# Patient Record
Sex: FEMALE | Race: WHITE | NOT HISPANIC OR LATINO | Employment: PART TIME | URBAN - METROPOLITAN AREA
[De-identification: names, ages, dates, MRNs, and addresses within clinical notes are randomized per-mention and may not be internally consistent; named-entity substitution may affect disease eponyms.]

---

## 2021-06-22 ENCOUNTER — HOSPITAL ENCOUNTER (OUTPATIENT)
Dept: OTHER | Facility: HOSPITAL | Age: 41
Discharge: HOME | End: 2021-06-22

## 2022-08-25 ENCOUNTER — OFFICE VISIT (OUTPATIENT)
Dept: INTERNAL MEDICINE | Facility: CLINIC | Age: 42
End: 2022-08-25

## 2022-08-25 VITALS
TEMPERATURE: 98.1 F | SYSTOLIC BLOOD PRESSURE: 154 MMHG | HEART RATE: 81 BPM | BODY MASS INDEX: 30 KG/M2 | HEIGHT: 62 IN | DIASTOLIC BLOOD PRESSURE: 94 MMHG | WEIGHT: 163 LBS | OXYGEN SATURATION: 98 %

## 2022-08-25 DIAGNOSIS — Z11.59 NEED FOR HEPATITIS C SCREENING TEST: ICD-10-CM

## 2022-08-25 DIAGNOSIS — Z00.00 ENCOUNTER FOR MEDICAL EXAMINATION TO ESTABLISH CARE: Primary | ICD-10-CM

## 2022-08-25 DIAGNOSIS — I10 HYPERTENSION, UNSPECIFIED TYPE: Chronic | ICD-10-CM

## 2022-08-25 DIAGNOSIS — D64.9 ANEMIA, UNSPECIFIED TYPE: ICD-10-CM

## 2022-08-25 DIAGNOSIS — J30.9 ALLERGIC RHINITIS, UNSPECIFIED SEASONALITY, UNSPECIFIED TRIGGER: ICD-10-CM

## 2022-08-25 DIAGNOSIS — B35.4 RINGWORM OF BODY: ICD-10-CM

## 2022-08-25 DIAGNOSIS — Z13.220 SCREENING FOR LIPID DISORDERS: ICD-10-CM

## 2022-08-25 DIAGNOSIS — Z00.00 ANNUAL PHYSICAL EXAM: ICD-10-CM

## 2022-08-25 LAB
ALBUMIN SERPL-MCNC: 4.3 G/DL (ref 3.5–5.2)
ALBUMIN/GLOB SERPL: 1.5 G/DL
ALP SERPL-CCNC: 75 U/L (ref 39–117)
ALT SERPL W P-5'-P-CCNC: 10 U/L (ref 1–33)
ANION GAP SERPL CALCULATED.3IONS-SCNC: 12 MMOL/L (ref 5–15)
AST SERPL-CCNC: 19 U/L (ref 1–32)
BASOPHILS # BLD AUTO: 0.07 10*3/MM3 (ref 0–0.2)
BASOPHILS NFR BLD AUTO: 1.1 % (ref 0–1.5)
BILIRUB SERPL-MCNC: 0.2 MG/DL (ref 0–1.2)
BUN SERPL-MCNC: 9 MG/DL (ref 6–20)
BUN/CREAT SERPL: 10.1 (ref 7–25)
CALCIUM SPEC-SCNC: 9.8 MG/DL (ref 8.6–10.5)
CHLORIDE SERPL-SCNC: 99 MMOL/L (ref 98–107)
CHOLEST SERPL-MCNC: 207 MG/DL (ref 0–200)
CO2 SERPL-SCNC: 29 MMOL/L (ref 22–29)
CREAT SERPL-MCNC: 0.89 MG/DL (ref 0.57–1)
DEPRECATED RDW RBC AUTO: 39.6 FL (ref 37–54)
EGFRCR SERPLBLD CKD-EPI 2021: 83.1 ML/MIN/1.73
EOSINOPHIL # BLD AUTO: 0.14 10*3/MM3 (ref 0–0.4)
EOSINOPHIL NFR BLD AUTO: 2.2 % (ref 0.3–6.2)
ERYTHROCYTE [DISTWIDTH] IN BLOOD BY AUTOMATED COUNT: 14.4 % (ref 12.3–15.4)
GLOBULIN UR ELPH-MCNC: 2.8 GM/DL
GLUCOSE SERPL-MCNC: 79 MG/DL (ref 65–99)
HCT VFR BLD AUTO: 36.3 % (ref 34–46.6)
HCV AB SER DONR QL: NORMAL
HDLC SERPL-MCNC: 45 MG/DL (ref 40–60)
HGB BLD-MCNC: 11.6 G/DL (ref 12–15.9)
IMM GRANULOCYTES # BLD AUTO: 0.02 10*3/MM3 (ref 0–0.05)
IMM GRANULOCYTES NFR BLD AUTO: 0.3 % (ref 0–0.5)
LDLC SERPL CALC-MCNC: 132 MG/DL (ref 0–100)
LDLC/HDLC SERPL: 2.86 {RATIO}
LYMPHOCYTES # BLD AUTO: 1.73 10*3/MM3 (ref 0.7–3.1)
LYMPHOCYTES NFR BLD AUTO: 26.8 % (ref 19.6–45.3)
MCH RBC QN AUTO: 24.3 PG (ref 26.6–33)
MCHC RBC AUTO-ENTMCNC: 32 G/DL (ref 31.5–35.7)
MCV RBC AUTO: 76.1 FL (ref 79–97)
MONOCYTES # BLD AUTO: 0.43 10*3/MM3 (ref 0.1–0.9)
MONOCYTES NFR BLD AUTO: 6.7 % (ref 5–12)
NEUTROPHILS NFR BLD AUTO: 4.07 10*3/MM3 (ref 1.7–7)
NEUTROPHILS NFR BLD AUTO: 62.9 % (ref 42.7–76)
NRBC BLD AUTO-RTO: 0 /100 WBC (ref 0–0.2)
PLATELET # BLD AUTO: 298 10*3/MM3 (ref 140–450)
PMV BLD AUTO: 11.2 FL (ref 6–12)
POTASSIUM SERPL-SCNC: 3.1 MMOL/L (ref 3.5–5.2)
PROT SERPL-MCNC: 7.1 G/DL (ref 6–8.5)
RBC # BLD AUTO: 4.77 10*6/MM3 (ref 3.77–5.28)
SODIUM SERPL-SCNC: 140 MMOL/L (ref 136–145)
TRIGL SERPL-MCNC: 167 MG/DL (ref 0–150)
TSH SERPL DL<=0.05 MIU/L-ACNC: 1.07 UIU/ML (ref 0.27–4.2)
VLDLC SERPL-MCNC: 30 MG/DL (ref 5–40)
WBC NRBC COR # BLD: 6.46 10*3/MM3 (ref 3.4–10.8)

## 2022-08-25 PROCEDURE — 80061 LIPID PANEL: CPT | Performed by: NURSE PRACTITIONER

## 2022-08-25 PROCEDURE — 80050 GENERAL HEALTH PANEL: CPT | Performed by: NURSE PRACTITIONER

## 2022-08-25 PROCEDURE — 84466 ASSAY OF TRANSFERRIN: CPT | Performed by: NURSE PRACTITIONER

## 2022-08-25 PROCEDURE — 83540 ASSAY OF IRON: CPT | Performed by: NURSE PRACTITIONER

## 2022-08-25 PROCEDURE — 99214 OFFICE O/P EST MOD 30 MIN: CPT | Performed by: NURSE PRACTITIONER

## 2022-08-25 PROCEDURE — 99396 PREV VISIT EST AGE 40-64: CPT | Performed by: NURSE PRACTITIONER

## 2022-08-25 PROCEDURE — 86803 HEPATITIS C AB TEST: CPT | Performed by: NURSE PRACTITIONER

## 2022-08-25 RX ORDER — HYDROCHLOROTHIAZIDE 25 MG/1
1 TABLET ORAL DAILY
COMMUNITY
Start: 2022-05-19 | End: 2022-08-25

## 2022-08-25 RX ORDER — LISINOPRIL 5 MG/1
5 TABLET ORAL DAILY
Qty: 90 TABLET | Refills: 1 | Status: SHIPPED | OUTPATIENT
Start: 2022-08-25 | End: 2022-09-22

## 2022-08-25 RX ORDER — POTASSIUM CHLORIDE 750 MG/1
10 TABLET, FILM COATED, EXTENDED RELEASE ORAL DAILY
COMMUNITY

## 2022-08-25 RX ORDER — FLUTICASONE PROPIONATE 50 MCG
2 SPRAY, SUSPENSION (ML) NASAL DAILY
Qty: 16 G | Refills: 0 | Status: SHIPPED | OUTPATIENT
Start: 2022-08-25

## 2022-08-25 RX ORDER — FLUTICASONE PROPIONATE 50 MCG
SPRAY, SUSPENSION (ML) NASAL
COMMUNITY
Start: 2018-10-24 | End: 2022-08-25 | Stop reason: SDUPTHER

## 2022-08-25 NOTE — PROGRESS NOTES
"Chief Complaint  Establish Care and Hypertension (Needing HCTZ refilled)    Subjective          Lidia Davis presents to [facility]  Migraine  Headache pattern:  Headache sometimes there, sometimes not at all  Hypertension  This is a chronic problem. The problem is uncontrolled. Pertinent negatives include no anxiety, blurred vision, chest pain, headaches, malaise/fatigue, neck pain, orthopnea, palpitations, peripheral edema, PND, shortness of breath or sweats. Past treatments include diuretics. The current treatment provides mild improvement. Compliance problems include diet and exercise.  There is no history of angina, kidney disease, CAD/MI, CVA, heart failure, left ventricular hypertrophy, PVD or retinopathy.   Has not had issues for 2 years was on Preventative medication. (triptan)     Previous PCP: came from out of [facility]  Specialist(s): none  COVID vaccine: completed, including booster  Pneumonia vaccine: never  Shingles vaccine: never  Colon cancer screening: never, no FHx of colon cancer  Mammogram: April 2022, normal   Pap Smear: April 2022, normal       Usually 120's/90's   Has been out of K+ supplement for 1 week    Allergic Rhinitis:  Allegra and Flonase well controlled.         Current Outpatient Medications   Medication Instructions   • Clotrimazole 1 % ointment 1 application, Apply externally, 2 Times Daily   • fluticasone (FLONASE) 50 MCG/ACT nasal spray 2 sprays, Nasal, Daily   • lisinopril (PRINIVIL,ZESTRIL) 5 mg, Oral, Daily   • potassium chloride 10 MEQ CR tablet 10 mEq, Oral, Daily       The following portions of the patient's history were reviewed and updated as appropriate: allergies, current medications, past family history, past medical history, past social history, past surgical history, and problem list.    Objective   Vital Signs:   /94   Pulse 81   Temp 98.1 °F (36.7 °C) (Temporal)   Ht 157.5 cm (62\")   Wt 73.9 kg (163 lb)   SpO2 " 98%   BMI 29.81 kg/m²     Wt Readings from Last 3 Encounters:   08/25/22 73.9 kg (163 lb)     BP Readings from Last 3 Encounters:   08/25/22 154/94     Physical Exam   Appearance: No acute distress, well-nourished  Head: normocephalic, atraumatic  Eyes: extraocular movements intact, no scleral icterus, no conjunctival injection  Ears, Nose, and Throat: external ears normal, nares patent, moist mucous membranes  Cardiovascular: regular rate and rhythm. no murmurs, rubs, or gallops. no edema  Respiratory: breathing comfortably, symmetric chest rise, clear to auscultation bilaterally. No wheezes, rales, or rhonchi.  Neuro: alert and oriented to time, place, and person. Normal gait  Psych: normal mood and affect   Skin: annular lesion to right inner elbow   Result Review :   The following data was reviewed by: RD Moya on 08/25/2022:  Common labs    Common Labsle 8/25/22 8/25/22 8/25/22    1439 1439 1439   Glucose  79    BUN  9    Creatinine  0.89    Sodium  140    Potassium  3.1 (A)    Chloride  99    Calcium  9.8    Albumin  4.30    Total Bilirubin  0.2    Alkaline Phosphatase  75    AST (SGOT)  19    ALT (SGPT)  10    WBC 6.46     Hemoglobin 11.6 (A)     Hematocrit 36.3     Platelets 298     Total Cholesterol   207 (A)   Triglycerides   167 (A)   HDL Cholesterol   45   LDL Cholesterol    132 (A)   (A) Abnormal value                   No results found for: SARSANTIGEN, COVID19, RAPFLUA, RAPFLUB, FLUAAG, FLUABDAG, FLUABDAG, FLU, FLU, FLUBAG, RAPSCRN, STREPAAG, RSV, POCPREGUR, MONOSPOT, INR, LEADCAPBLD, POCLEAD, BILIRUBINUR    Procedures        Assessment and Plan    Diagnoses and all orders for this visit:    1. Encounter for medical examination to establish care (Primary)    2. Hypertension, unspecified type  Comments:  Stop HCTZ; start lisinopril, dash diet exercise; 4wk f/u  Orders:  -     CBC & Differential  -     TSH Rfx On Abnormal To Free T4  -     Comprehensive Metabolic Panel  -      lisinopril (PRINIVIL,ZESTRIL) 5 MG tablet; Take 1 tablet by mouth Daily.  Dispense: 90 tablet; Refill: 1    3. Screening for lipid disorders  -     Lipid Panel    4. Annual physical exam  -     CBC & Differential  -     TSH Rfx On Abnormal To Free T4  -     Comprehensive Metabolic Panel    5. Need for hepatitis C screening test  -     Hepatitis C Antibody    6. Allergic rhinitis, unspecified seasonality, unspecified trigger  -     fluticasone (FLONASE) 50 MCG/ACT nasal spray; 2 sprays into the nostril(s) as directed by provider Daily.  Dispense: 16 g; Refill: 0    7. Ringworm of body  -     Clotrimazole 1 % ointment; Apply 1 application topically 2 (Two) Times a Day.  Dispense: 56.7 g; Refill: 0    8. Anemia, unspecified type  -     Iron and TIBC; Future      Advised on diet, physical activity, sunscreen, helmet, texting and driving, etc      Medications Discontinued During This Encounter   Medication Reason   • hydroCHLOROthiazide (HYDRODIURIL) 25 MG tablet Historical Med - Therapy completed   • fluticasone (FLONASE) 50 MCG/ACT nasal spray Reorder          Follow Up   Return in about 4 weeks (around 9/22/2022) for Hypertension.  Patient was given instructions and counseling regarding her condition or for health maintenance advice. Please see specific information pulled into the AVS if appropriate.       RD Moya  08/26/22  09:39 EDT

## 2022-08-26 ENCOUNTER — TELEPHONE (OUTPATIENT)
Dept: INTERNAL MEDICINE | Facility: CLINIC | Age: 42
End: 2022-08-26

## 2022-08-26 DIAGNOSIS — D50.9 IRON DEFICIENCY ANEMIA, UNSPECIFIED IRON DEFICIENCY ANEMIA TYPE: Primary | ICD-10-CM

## 2022-08-26 LAB
IRON 24H UR-MRATE: 28 MCG/DL (ref 37–145)
IRON SATN MFR SERPL: 6 % (ref 20–50)
TIBC SERPL-MCNC: 490 MCG/DL (ref 298–536)
TRANSFERRIN SERPL-MCNC: 329 MG/DL (ref 200–360)

## 2022-08-26 NOTE — TELEPHONE ENCOUNTER
----- Message from DR Moya sent at 8/26/2022 10:32 AM EDT -----  Anemia noted on labs. I reflexed an iron profile which is low and likely cause of anemia. I would like patient to start daily iron supplement and we will recheck in 3 months.

## 2022-09-22 ENCOUNTER — OFFICE VISIT (OUTPATIENT)
Dept: INTERNAL MEDICINE | Facility: CLINIC | Age: 42
End: 2022-09-22

## 2022-09-22 VITALS
BODY MASS INDEX: 31.83 KG/M2 | HEIGHT: 62 IN | HEART RATE: 77 BPM | WEIGHT: 173 LBS | DIASTOLIC BLOOD PRESSURE: 91 MMHG | SYSTOLIC BLOOD PRESSURE: 146 MMHG | TEMPERATURE: 98.9 F | OXYGEN SATURATION: 98 %

## 2022-09-22 DIAGNOSIS — I10 HYPERTENSION, UNSPECIFIED TYPE: Primary | ICD-10-CM

## 2022-09-22 DIAGNOSIS — D50.9 IRON DEFICIENCY ANEMIA, UNSPECIFIED IRON DEFICIENCY ANEMIA TYPE: ICD-10-CM

## 2022-09-22 DIAGNOSIS — I10 HYPERTENSION, UNSPECIFIED TYPE: ICD-10-CM

## 2022-09-22 DIAGNOSIS — Z23 INFLUENZA VACCINE NEEDED: ICD-10-CM

## 2022-09-22 PROCEDURE — 99214 OFFICE O/P EST MOD 30 MIN: CPT | Performed by: NURSE PRACTITIONER

## 2022-09-22 PROCEDURE — 90686 IIV4 VACC NO PRSV 0.5 ML IM: CPT | Performed by: NURSE PRACTITIONER

## 2022-09-22 PROCEDURE — 90471 IMMUNIZATION ADMIN: CPT | Performed by: NURSE PRACTITIONER

## 2022-09-22 RX ORDER — AMLODIPINE BESYLATE 2.5 MG/1
2.5 TABLET ORAL DAILY
Qty: 90 TABLET | Refills: 0 | Status: SHIPPED | OUTPATIENT
Start: 2022-09-22 | End: 2022-10-26

## 2022-09-22 RX ORDER — AMLODIPINE BESYLATE 2.5 MG/1
2.5 TABLET ORAL DAILY
Qty: 30 TABLET | Refills: 1 | Status: SHIPPED | OUTPATIENT
Start: 2022-09-22 | End: 2022-09-22

## 2022-09-22 NOTE — ASSESSMENT & PLAN NOTE
Hypertension is improving with treatment.  Dietary sodium restriction.  Weight loss.  Regular aerobic exercise.  Blood pressure will be reassessed in 4 weeks.

## 2022-09-22 NOTE — TELEPHONE ENCOUNTER
If I send 90 days and we have to increase her medication at the next visit she will be stuck with many tablets that she may not need. Please call and double check this is ok with her.

## 2022-09-22 NOTE — PROGRESS NOTES
"Chief Complaint  Hypertension (4 week follow-up)    Subjective          Lidia Davis presents to [facility]  Anemia  Presents for initial visit. The condition has lasted for 4 weeks. Symptoms include malaise/fatigue. There has been no abdominal pain, anorexia, bruising/bleeding easily, confusion, fever, leg swelling, light-headedness, pallor, palpitations, paresthesias, pica or weight loss. (None )   Treatments tried: sent in ferrous sulfate today        Hypertension  This is a chronic problem. The problem is uncontrolled. Pertinent negatives include no anxiety, blurred vision, chest pain, headaches, malaise/fatigue, neck pain, orthopnea, palpitations, peripheral edema, PND, shortness of breath or sweats. Past treatments include diuretics. The current treatment provides mild improvement. Compliance problems include diet and exercise.  There is no history of angina, kidney disease, CAD/MI, CVA, heart failure, left ventricular hypertrophy, PVD or retinopathy.  Current Outpatient Medications   Medication Instructions   • amLODIPine (NORVASC) 2.5 mg, Oral, Daily   • Clotrimazole 1 % ointment 1 application, Apply externally, 2 Times Daily   • fluticasone (FLONASE) 50 MCG/ACT nasal spray 2 sprays, Nasal, Daily   • Iron (Ferrous Sulfate) 325 mg, Oral, Daily   • potassium chloride 10 MEQ CR tablet 10 mEq, Oral, Daily       The following portions of the patient's history were reviewed and updated as appropriate: allergies, current medications, past family history, past medical history, past social history, past surgical history, and problem list.    Objective   Vital Signs:   /91 (BP Location: Left arm, Patient Position: Sitting, Cuff Size: Adult)   Pulse 77   Temp 98.9 °F (37.2 °C) (Temporal)   Ht 157.5 cm (62\")   Wt 78.5 kg (173 lb)   SpO2 98%   BMI 31.64 kg/m²     Wt Readings from Last 3 Encounters:   09/22/22 78.5 kg (173 lb)   08/25/22 73.9 kg (163 lb)     BP " Readings from Last 3 Encounters:   09/22/22 146/91   08/25/22 154/94     Physical Exam   Appearance: No acute distress, well-nourished  Head: normocephalic, atraumatic  Eyes: extraocular movements intact, no scleral icterus, no conjunctival injection  Ears, Nose, and Throat: external ears normal, nares patent, moist mucous membranes  Cardiovascular: regular rate and rhythm. no murmurs, rubs, or gallops. no edema  Respiratory: breathing comfortably, symmetric chest rise, clear to auscultation bilaterally. No wheezes, rales, or rhonchi.  Neuro: alert and oriented to time, place, and person. Normal gait  Psych: normal mood and affect     Result Review :   The following data was reviewed by: RD Moya on 09/22/2022:  Common labs    Common Labs 8/25/22 8/25/22 8/25/22    1439 1439 1439   Glucose  79    BUN  9    Creatinine  0.89    Sodium  140    Potassium  3.1 (A)    Chloride  99    Calcium  9.8    Albumin  4.30    Total Bilirubin  0.2    Alkaline Phosphatase  75    AST (SGOT)  19    ALT (SGPT)  10    WBC 6.46     Hemoglobin 11.6 (A)     Hematocrit 36.3     Platelets 298     Total Cholesterol   207 (A)   Triglycerides   167 (A)   HDL Cholesterol   45   LDL Cholesterol    132 (A)   (A) Abnormal value                   No results found for: SARSANTIGEN, COVID19, RAPFLUA, RAPFLUB, FLUAAG, FLUABDAG, FLUABDAG, FLU, FLU, FLUBAG, RAPSCRN, STREPAAG, RSV, POCPREGUR, MONOSPOT, INR, LEADCAPBLD, POCLEAD, BILIRUBINUR    Procedures        Assessment and Plan    Diagnoses and all orders for this visit:    1. Hypertension, unspecified type (Primary)  Comments:  lisinopril was making her nauseated without good control, will switch to amllodipine.   Assessment & Plan:  Hypertension is improving with treatment.  Dietary sodium restriction.  Weight loss.  Regular aerobic exercise.  Blood pressure will be reassessed in 4 weeks.    Orders:  -     amLODIPine (NORVASC) 2.5 MG tablet; Take 1 tablet by mouth Daily.  Dispense: 30  tablet; Refill: 1    2. Influenza vaccine needed  -     FluLaval/Fluzone >6 mos (5084-2480)    3. Iron deficiency anemia, unspecified iron deficiency anemia type  -     Iron, Ferrous Sulfate, 325 (65 Fe) MG tablet; Take 325 mg by mouth Daily.  Dispense: 90 tablet; Refill: 1    Counseling was given to patient for the following topics: diagnostic results, instructions for management, risk factor reductions, risks and benefits of treatment options and importance of treatment compliance .       Medications Discontinued During This Encounter   Medication Reason   • lisinopril (PRINIVIL,ZESTRIL) 5 MG tablet Historical Med - Therapy completed   • Iron, Ferrous Sulfate, 325 (65 Fe) MG tablet Reorder          Follow Up   Return in about 4 weeks (around 10/20/2022) for Hypertension.  Patient was given instructions and counseling regarding her condition or for health maintenance advice. Please see specific information pulled into the AVS if appropriate.       Veronica Acuna, RD  09/22/22  08:26 EDT

## 2022-10-26 ENCOUNTER — OFFICE VISIT (OUTPATIENT)
Dept: INTERNAL MEDICINE | Facility: CLINIC | Age: 42
End: 2022-10-26

## 2022-10-26 VITALS
TEMPERATURE: 97.7 F | BODY MASS INDEX: 32.17 KG/M2 | WEIGHT: 174.8 LBS | HEART RATE: 81 BPM | SYSTOLIC BLOOD PRESSURE: 150 MMHG | HEIGHT: 62 IN | OXYGEN SATURATION: 98 % | DIASTOLIC BLOOD PRESSURE: 89 MMHG

## 2022-10-26 DIAGNOSIS — I10 HYPERTENSION, UNSPECIFIED TYPE: Primary | ICD-10-CM

## 2022-10-26 DIAGNOSIS — L98.9 SKIN LESION: ICD-10-CM

## 2022-10-26 PROCEDURE — 99213 OFFICE O/P EST LOW 20 MIN: CPT | Performed by: NURSE PRACTITIONER

## 2022-10-26 RX ORDER — AMLODIPINE BESYLATE 5 MG/1
5 TABLET ORAL DAILY
Qty: 30 TABLET | Refills: 1 | Status: SHIPPED | OUTPATIENT
Start: 2022-10-26 | End: 2022-11-30 | Stop reason: SINTOL

## 2022-10-26 NOTE — PROGRESS NOTES
"Chief Complaint  Hypertension (Follow up ) and spots on face  (6 weeks just on the right side close to her eye temple area)    Subjective          Lidia Davis presents to [facility]  History of Present Illness    Hypertension  This is a chronic problem. The problem is uncontrolled. Pertinent negatives include no anxiety, blurred vision, chest pain, headaches, malaise/fatigue, neck pain, orthopnea, palpitations, peripheral edema, PND, shortness of breath or sweats. Past treatments include diuretics. The current treatment provides mild improvement. Compliance problems include diet and exercise.  There is no history of angina, kidney disease, CAD/MI, CVA, heart failure, left ventricular hypertrophy, PVD or retinopathy.  Current Outpatient Medications   Medication Instructions   • amLODIPine (NORVASC) 5 mg, Oral, Daily   • Clotrimazole 1 % ointment 1 application, Apply externally, 2 Times Daily   • fluticasone (FLONASE) 50 MCG/ACT nasal spray 2 sprays, Nasal, Daily   • Iron (Ferrous Sulfate) 325 mg, Oral, Daily   • mupirocin (BACTROBAN) 2 % ointment 1 application, Topical, 3 Times Daily   • potassium chloride 10 MEQ CR tablet 10 mEq, Oral, Daily       The following portions of the patient's history were reviewed and updated as appropriate: allergies, current medications, past family history, past medical history, past social history, past surgical history, and problem list.    Objective   Vital Signs:   /89 (BP Location: Left arm)   Pulse 81   Temp 97.7 °F (36.5 °C) (Temporal)   Ht 157.5 cm (62\")   Wt 79.3 kg (174 lb 12.8 oz)   SpO2 98%   BMI 31.97 kg/m²     Wt Readings from Last 3 Encounters:   10/26/22 79.3 kg (174 lb 12.8 oz)   09/22/22 78.5 kg (173 lb)   08/25/22 73.9 kg (163 lb)     BP Readings from Last 3 Encounters:   10/26/22 150/89   09/22/22 146/91   08/25/22 154/94     Physical Exam   Appearance: No acute distress, well-nourished  Head: " normocephalic, atraumatic  Eyes: extraocular movements intact, no scleral icterus, no conjunctival injection  Ears, Nose, and Throat: external ears normal, nares patent, moist mucous membranes  Cardiovascular: regular rate and rhythm. no murmurs, rubs, or gallops. no edema  Respiratory: breathing comfortably, symmetric chest rise, clear to auscultation bilaterally. No wheezes, rales, or rhonchi.  Neuro: alert and oriented to time, place, and person. Normal gait  Psych: normal mood and affect     Result Review :   The following data was reviewed by: RD Moya on 10/26/2022:  Common labs    Common Labs 8/25/22 8/25/22 8/25/22    1439 1439 1439   Glucose  79    BUN  9    Creatinine  0.89    Sodium  140    Potassium  3.1 (A)    Chloride  99    Calcium  9.8    Albumin  4.30    Total Bilirubin  0.2    Alkaline Phosphatase  75    AST (SGOT)  19    ALT (SGPT)  10    WBC 6.46     Hemoglobin 11.6 (A)     Hematocrit 36.3     Platelets 298     Total Cholesterol   207 (A)   Triglycerides   167 (A)   HDL Cholesterol   45   LDL Cholesterol    132 (A)   (A) Abnormal value                   No results found for: SARSANTIGEN, COVID19, RAPFLUA, RAPFLUB, FLUAAG, FLUABDAG, FLUABDAG, FLU, FLU, FLUBAG, RAPSCRN, STREPAAG, RSV, POCPREGUR, MONOSPOT, INR, LEADCAPBLD, POCLEAD, BILIRUBINUR    Procedures        Assessment and Plan    Diagnoses and all orders for this visit:    1. Hypertension, unspecified type (Primary)  -     amLODIPine (NORVASC) 5 MG tablet; Take 1 tablet by mouth Daily.  Dispense: 30 tablet; Refill: 1    2. Skin lesion  -     mupirocin (BACTROBAN) 2 % ointment; Apply 1 application topically to the appropriate area as directed 3 (Three) Times a Day.  Dispense: 30 g; Refill: 0          Medications Discontinued During This Encounter   Medication Reason   • amLODIPine (NORVASC) 2.5 MG tablet           Follow Up   Return in about 4 weeks (around 11/23/2022) for Hypertension.  Patient was given instructions and  counseling regarding her condition or for health maintenance advice. Please see specific information pulled into the AVS if appropriate.       Veronica Acuna, RD  10/26/22  08:18 EDT

## 2022-11-30 ENCOUNTER — OFFICE VISIT (OUTPATIENT)
Dept: INTERNAL MEDICINE | Facility: CLINIC | Age: 42
End: 2022-11-30

## 2022-11-30 VITALS
WEIGHT: 175 LBS | HEIGHT: 62 IN | SYSTOLIC BLOOD PRESSURE: 150 MMHG | HEART RATE: 96 BPM | DIASTOLIC BLOOD PRESSURE: 90 MMHG | BODY MASS INDEX: 32.2 KG/M2 | OXYGEN SATURATION: 98 % | TEMPERATURE: 98.2 F

## 2022-11-30 DIAGNOSIS — M25.473 ANKLE SWELLING, UNSPECIFIED LATERALITY: ICD-10-CM

## 2022-11-30 DIAGNOSIS — I10 HYPERTENSION, UNSPECIFIED TYPE: Primary | Chronic | ICD-10-CM

## 2022-11-30 PROCEDURE — 99213 OFFICE O/P EST LOW 20 MIN: CPT | Performed by: NURSE PRACTITIONER

## 2022-11-30 RX ORDER — LISINOPRIL 10 MG/1
10 TABLET ORAL DAILY
Qty: 30 TABLET | Refills: 1 | Status: SHIPPED | OUTPATIENT
Start: 2022-11-30 | End: 2022-12-14 | Stop reason: SDUPTHER

## 2022-11-30 NOTE — PROGRESS NOTES
"Chief Complaint  Hypertension (Pt here to discuss BP issues/)    Subjective          Lidia Davis presents to [facility]  History of Present Illness    Hypertension  This is a chronic problem. The problem is uncontrolled. Pertinent negatives include no anxiety, blurred vision, chest pain, headaches, malaise/fatigue, neck pain, orthopnea, palpitations, peripheral edema, PND, shortness of breath or sweats. Past treatments include diuretics. The current treatment provides mild improvement. Compliance problems include diet and exercise.  There is no history of angina, kidney disease, CAD/MI, CVA, heart failure, left ventricular hypertrophy, PVD or retinopathy.      Current Outpatient Medications   Medication Instructions   • Clotrimazole 1 % ointment 1 application, Apply externally, 2 Times Daily   • fluticasone (FLONASE) 50 MCG/ACT nasal spray 2 sprays, Nasal, Daily   • Iron (Ferrous Sulfate) 325 mg, Oral, Daily   • lisinopril (PRINIVIL,ZESTRIL) 10 mg, Oral, Daily   • mupirocin (BACTROBAN) 2 % ointment 1 application, Topical, 3 Times Daily   • potassium chloride 10 MEQ CR tablet 10 mEq, Oral, Daily       The following portions of the patient's history were reviewed and updated as appropriate: allergies, current medications, past family history, past medical history, past social history, past surgical history, and problem list.    Objective   Vital Signs:   /90 (BP Location: Left arm, Patient Position: Sitting, Cuff Size: Adult)   Pulse 96   Temp 98.2 °F (36.8 °C) (Temporal)   Ht 157.5 cm (62\")   Wt 79.4 kg (175 lb)   SpO2 98%   BMI 32.01 kg/m²     Wt Readings from Last 3 Encounters:   11/30/22 79.4 kg (175 lb)   10/26/22 79.3 kg (174 lb 12.8 oz)   09/22/22 78.5 kg (173 lb)     BP Readings from Last 3 Encounters:   11/30/22 150/90   10/26/22 150/89   09/22/22 146/91     Physical Exam   Appearance: No acute distress, well-nourished  Head: normocephalic, " atraumatic  Eyes: extraocular movements intact, no scleral icterus, no conjunctival injection  Ears, Nose, and Throat: external ears normal, nares patent, moist mucous membranes  Cardiovascular: regular rate and rhythm. no murmurs, rubs, or gallops. no edema  Respiratory: breathing comfortably, symmetric chest rise, clear to auscultation bilaterally. No wheezes, rales, or rhonchi.  Neuro: alert and oriented to time, place, and person. Normal gait  Psych: normal mood and affect     Result Review :   The following data was reviewed by: RD Moya on 11/30/2022:  Common labs    Common Labs 8/25/22 8/25/22 8/25/22    1439 1439 1439   Glucose  79    BUN  9    Creatinine  0.89    Sodium  140    Potassium  3.1 (A)    Chloride  99    Calcium  9.8    Albumin  4.30    Total Bilirubin  0.2    Alkaline Phosphatase  75    AST (SGOT)  19    ALT (SGPT)  10    WBC 6.46     Hemoglobin 11.6 (A)     Hematocrit 36.3     Platelets 298     Total Cholesterol   207 (A)   Triglycerides   167 (A)   HDL Cholesterol   45   LDL Cholesterol    132 (A)   (A) Abnormal value                   No results found for: SARSANTIGEN, COVID19, RAPFLUA, RAPFLUB, FLUAAG, FLUABDAG, FLUABDAG, FLU, FLU, FLUBAG, RAPSCRN, STREPAAG, RSV, POCPREGUR, MONOSPOT, INR, LEADCAPBLD, POCLEAD, BILIRUBINUR    Procedures        Assessment and Plan    Diagnoses and all orders for this visit:    1. Hypertension, unspecified type (Primary)  Comments:  changing from amlodipine to lisinopril; DASH diet and exercise   Orders:  -     lisinopril (PRINIVIL,ZESTRIL) 10 MG tablet; Take 1 tablet by mouth Daily.  Dispense: 30 tablet; Refill: 1    2. Ankle swelling, unspecified laterality  Comments:  likely r/t amlodipine           Medications Discontinued During This Encounter   Medication Reason   • amLODIPine (NORVASC) 5 MG tablet Side effects          Follow Up   Return in about 2 weeks (around 12/14/2022) for Hypertension.  Patient was given instructions and counseling  regarding her condition or for health maintenance advice. Please see specific information pulled into the AVS if appropriate.       Veronica Acuna, RD  11/30/22  10:21 EST

## 2022-12-05 ENCOUNTER — E-VISIT (OUTPATIENT)
Dept: ADMINISTRATIVE | Facility: OTHER | Age: 42
End: 2022-12-05

## 2022-12-05 NOTE — E-VISIT ESCALATED
Chief Complaint: Eye pain, eye bump, or irritation   Patient was shown the following escalation message:   Some conditions need a visit with a healthcare provider   Severe eyelid swelling may suggest a complicated condition. You should speak with a provider to get care.   ----------   Patient Interview Transcript:   Which of these symptoms are bothering you? Select all that apply.    Redness in the whites of the eye(s)    Eye drainage (such as excess tears, mucus, or pus)    Eyelid swelling    Eyelid redness    Crusting of the eyelids or eyelashes   Not selected:    Eye dryness    Bump or pimple on or inside the eyelid    None of the above   Do you have any of these eye symptoms? Select all that apply.    Itchiness   Not selected:    Burning, ai, or gritty feeling    Pain inside the eyeball    Eyelid pain    Flaking or dry skin on the eyelid    None of the above   How long have you had your eye symptoms? Select one.    Less than 1 day   Not selected:    1 to 3 days    4 to 7 days    More than 7 days   Which eye is affected? Select one.    My left eye   Not selected:    My right eye    Both eyes    It started in one eye, but now both eyes are affected   Does it feel like there's something in your eye that's making it hard to open your eye or keep it open? Select one.    No   Not selected:    Yes   Is the skin around your eye red, swollen, or warm to the touch? Pay special attention to the area above your eyelid, the upper cheek, forehead, and the top part of the nose (between the eyes). Select one.    Yes   Not selected:    No   You mentioned that your eyelid is swollen. How would you describe it? Select one.    Swollen, and it's hard to open my eye   Not selected:    A little puffy, but I can easily open my eye    My eye is completely swollen shut   ----------   Medical history   Medical history data does not currently exist for this patient.

## 2022-12-14 ENCOUNTER — OFFICE VISIT (OUTPATIENT)
Dept: INTERNAL MEDICINE | Facility: CLINIC | Age: 42
End: 2022-12-14

## 2022-12-14 VITALS
WEIGHT: 175 LBS | HEART RATE: 107 BPM | TEMPERATURE: 98.7 F | HEIGHT: 62 IN | OXYGEN SATURATION: 98 % | SYSTOLIC BLOOD PRESSURE: 120 MMHG | BODY MASS INDEX: 32.2 KG/M2 | DIASTOLIC BLOOD PRESSURE: 69 MMHG

## 2022-12-14 DIAGNOSIS — I10 HYPERTENSION, UNSPECIFIED TYPE: Primary | Chronic | ICD-10-CM

## 2022-12-14 PROCEDURE — 99213 OFFICE O/P EST LOW 20 MIN: CPT | Performed by: NURSE PRACTITIONER

## 2022-12-14 RX ORDER — LISINOPRIL 10 MG/1
10 TABLET ORAL DAILY
Qty: 90 TABLET | Refills: 1 | Status: SHIPPED | OUTPATIENT
Start: 2022-12-14 | End: 2023-01-31

## 2022-12-14 NOTE — PROGRESS NOTES
"Chief Complaint  Hypertension (2 week follow-up)    Subjective          Lidia Davis presents to [facility]  History of Present Illness    Hypertension  This is a chronic problem. The problem is uncontrolled. Pertinent negatives include no anxiety, blurred vision, chest pain, headaches, malaise/fatigue, neck pain, orthopnea, palpitations, peripheral edema, PND, shortness of breath or sweats. Past treatments include diuretics. The current treatment provides mild improvement. Compliance problems include diet and exercise.  There is no history of angina, kidney disease, CAD/MI, CVA, heart failure, left ventricular hypertrophy, PVD or retinopathy.      Current Outpatient Medications   Medication Instructions   • Clotrimazole 1 % ointment 1 application, Apply externally, 2 Times Daily   • fluticasone (FLONASE) 50 MCG/ACT nasal spray 2 sprays, Nasal, Daily   • Iron (Ferrous Sulfate) 325 mg, Oral, Daily   • lisinopril (PRINIVIL,ZESTRIL) 10 mg, Oral, Daily   • mupirocin (BACTROBAN) 2 % ointment 1 application, Topical, 3 Times Daily   • potassium chloride 10 MEQ CR tablet 10 mEq, Oral, Daily       The following portions of the patient's history were reviewed and updated as appropriate: allergies, current medications, past family history, past medical history, past social history, past surgical history, and problem list.    Objective   Vital Signs:   /69 (BP Location: Left arm, Patient Position: Sitting, Cuff Size: Adult)   Pulse 107   Temp 98.7 °F (37.1 °C) (Temporal)   Ht 157.5 cm (62\")   Wt 79.4 kg (175 lb)   SpO2 98%   BMI 32.01 kg/m²     Wt Readings from Last 3 Encounters:   12/14/22 79.4 kg (175 lb)   12/05/22 79.4 kg (175 lb)   11/30/22 79.4 kg (175 lb)     BP Readings from Last 3 Encounters:   12/14/22 120/69   12/05/22 148/98   11/30/22 150/90     Physical Exam   Appearance: No acute distress, well-nourished  Head: normocephalic, atraumatic  Eyes: extraocular " movements intact, no scleral icterus, no conjunctival injection  Ears, Nose, and Throat: external ears normal, nares patent, moist mucous membranes  Cardiovascular: regular rate and rhythm. no murmurs, rubs, or gallops. no edema  Respiratory: breathing comfortably, symmetric chest rise, clear to auscultation bilaterally. No wheezes, rales, or rhonchi.  Neuro: alert and oriented to time, place, and person. Normal gait  Psych: normal mood and affect     Result Review :   The following data was reviewed by: RD Moya on 12/14/2022:  Common labs    Common Labs 8/25/22 8/25/22 8/25/22    1439 1439 1439   Glucose  79    BUN  9    Creatinine  0.89    Sodium  140    Potassium  3.1 (A)    Chloride  99    Calcium  9.8    Albumin  4.30    Total Bilirubin  0.2    Alkaline Phosphatase  75    AST (SGOT)  19    ALT (SGPT)  10    WBC 6.46     Hemoglobin 11.6 (A)     Hematocrit 36.3     Platelets 298     Total Cholesterol   207 (A)   Triglycerides   167 (A)   HDL Cholesterol   45   LDL Cholesterol    132 (A)   (A) Abnormal value                   Lab Results   Component Value Date    SARSANTIGEN Not Detected 12/05/2022    RAPSCRN Negative 12/05/2022       Procedures        Assessment and Plan    Diagnoses and all orders for this visit:    1. Hypertension, unspecified type (Primary)  Comments:  continue current dose of lisinopril; DASH diet and exercise  Orders:  -     lisinopril (PRINIVIL,ZESTRIL) 10 MG tablet; Take 1 tablet by mouth Daily.  Dispense: 90 tablet; Refill: 1          Medications Discontinued During This Encounter   Medication Reason   • lisinopril (PRINIVIL,ZESTRIL) 10 MG tablet Reorder          Follow Up   Return in about 3 months (around 3/14/2023) for Hypertension.  Patient was given instructions and counseling regarding her condition or for health maintenance advice. Please see specific information pulled into the AVS if appropriate.       RD Moya  12/14/22  08:39 EST

## 2022-12-22 DIAGNOSIS — I10 HYPERTENSION, UNSPECIFIED TYPE: ICD-10-CM

## 2022-12-22 RX ORDER — AMLODIPINE BESYLATE 5 MG/1
5 TABLET ORAL DAILY
Qty: 30 TABLET | Refills: 1 | OUTPATIENT
Start: 2022-12-22

## 2023-01-31 DIAGNOSIS — I10 HYPERTENSION, UNSPECIFIED TYPE: Chronic | ICD-10-CM

## 2023-01-31 RX ORDER — LISINOPRIL 10 MG/1
10 TABLET ORAL DAILY
Qty: 30 TABLET | Refills: 0 | Status: SHIPPED | OUTPATIENT
Start: 2023-01-31 | End: 2023-02-28

## 2023-02-01 ENCOUNTER — OFFICE VISIT (OUTPATIENT)
Dept: INTERNAL MEDICINE | Facility: CLINIC | Age: 43
End: 2023-02-01
Payer: COMMERCIAL

## 2023-02-01 VITALS
BODY MASS INDEX: 33.01 KG/M2 | SYSTOLIC BLOOD PRESSURE: 128 MMHG | WEIGHT: 179.4 LBS | OXYGEN SATURATION: 99 % | DIASTOLIC BLOOD PRESSURE: 88 MMHG | TEMPERATURE: 98.1 F | HEART RATE: 80 BPM | HEIGHT: 62 IN

## 2023-02-01 DIAGNOSIS — B96.89 BACTERIAL CONJUNCTIVITIS OF BOTH EYES: ICD-10-CM

## 2023-02-01 DIAGNOSIS — R05.1 ACUTE COUGH: ICD-10-CM

## 2023-02-01 DIAGNOSIS — H10.9 BACTERIAL CONJUNCTIVITIS OF BOTH EYES: ICD-10-CM

## 2023-02-01 DIAGNOSIS — K62.5 BRBPR (BRIGHT RED BLOOD PER RECTUM): Primary | ICD-10-CM

## 2023-02-01 PROCEDURE — 99213 OFFICE O/P EST LOW 20 MIN: CPT | Performed by: NURSE PRACTITIONER

## 2023-02-01 RX ORDER — METHYLPREDNISOLONE 4 MG/1
TABLET ORAL
Qty: 21 EACH | Refills: 0 | Status: SHIPPED | OUTPATIENT
Start: 2023-02-01 | End: 2023-03-16

## 2023-02-01 RX ORDER — ERYTHROMYCIN 5 MG/G
OINTMENT OPHTHALMIC NIGHTLY
Qty: 3.5 G | Refills: 0 | Status: SHIPPED | OUTPATIENT
Start: 2023-02-01 | End: 2023-02-08

## 2023-02-01 NOTE — PROGRESS NOTES
Chief Complaint  Cough (6+ weeks /Post nasal drip patient thinks ), Rash (Keeps coming and going around- 6 weeks as well /Patient had pink eye the second week of December and she thinks since then they never cleared up all the way ), and Black or Bloody Stool (Patient state she thinks she had blood in her stool- /Just over a week ago she was getting off her period she was unsure of due too that but the  week before her period she seen it )    Subjective          Lidia Davis presents to [facility]  Cough  This is a new problem. The cough is non-productive. Associated symptoms include postnasal drip. Pertinent negatives include no chest pain, chills, ear congestion, ear pain, fever, headaches, heartburn, hemoptysis, myalgias, nasal congestion, rash, rhinorrhea, sore throat, shortness of breath, sweats, weight loss or wheezing.   Rectal Bleeding  This is a new problem. Associated symptoms include coughing. Pertinent negatives include no abdominal pain, anorexia, arthralgias, change in bowel habit, chest pain, chills, congestion, fatigue, fever, headaches, joint swelling, myalgias, nausea, neck pain, numbness, rash, sore throat, swollen glands, urinary symptoms, vertigo, visual change, vomiting or weakness. Exacerbated by: Patient reports a burning sensation with bloody stool both on toilet paper and in toilet.  Has never had colonoscopy. Treatments tried: Denies family history of colon cancer.  States that bowel movements are nonpainful.   was tested for COVID and was negative.       Pink eye 1st or second week of December and since then she had persistent cough   Feels like it is PND Worse at night   Was RX'd moxifloxacin by Urgent care and finished the drops.   Continues to wake up with red eye and drainage and has rash around eyes.     Has been taking allegra and flonase and it hasnt helped.   Got all new make up after the pink eye . Switched her moisterizer.  "        Current Outpatient Medications   Medication Instructions   • Clotrimazole 1 % ointment 1 application, Apply externally, 2 Times Daily   • erythromycin (ROMYCIN) 5 MG/GM ophthalmic ointment Left Eye, Nightly   • fluticasone (FLONASE) 50 MCG/ACT nasal spray 2 sprays, Nasal, Daily   • Iron (Ferrous Sulfate) 325 mg, Oral, Daily   • lisinopril (PRINIVIL,ZESTRIL) 10 mg, Oral, Daily   • methylPREDNISolone (Medrol) 4 MG dose pack Take as directed on package instructions.   • mupirocin (BACTROBAN) 2 % ointment 1 application, Topical, 3 Times Daily   • potassium chloride 10 MEQ CR tablet 10 mEq, Oral, Daily       The following portions of the patient's history were reviewed and updated as appropriate: allergies, current medications, past family history, past medical history, past social history, past surgical history, and problem list.    Objective   Vital Signs:   /88 (BP Location: Left arm)   Pulse 80   Temp 98.1 °F (36.7 °C) (Temporal)   Ht 157.5 cm (62\")   Wt 81.4 kg (179 lb 6.4 oz)   SpO2 99%   BMI 32.81 kg/m²     Wt Readings from Last 3 Encounters:   02/01/23 81.4 kg (179 lb 6.4 oz)   12/14/22 79.4 kg (175 lb)   12/05/22 79.4 kg (175 lb)     BP Readings from Last 3 Encounters:   02/01/23 128/88   12/14/22 120/69   12/05/22 148/98     Physical Exam   Appearance: No acute distress, well-nourished  Head: normocephalic, atraumatic  Eyes: extraocular movements intact, no scleral icterus, no conjunctival injection  Ears, Nose, and Throat: external ears normal, nares patent, moist mucous membranes  Cardiovascular: regular rate and rhythm. no murmurs, rubs, or gallops. no edema  Respiratory: breathing comfortably, symmetric chest rise, clear to auscultation bilaterally. No wheezes, rales, or rhonchi.  Neuro: alert and oriented to time, place, and person. Normal gait  Psych: normal mood and affect     Result Review :   The following data was reviewed by: RD Moya on 02/01/2023:  Common labs  "   Common Labs 8/25/22 8/25/22 8/25/22    1439 1439 1439   Glucose  79    BUN  9    Creatinine  0.89    Sodium  140    Potassium  3.1 (A)    Chloride  99    Calcium  9.8    Albumin  4.30    Total Bilirubin  0.2    Alkaline Phosphatase  75    AST (SGOT)  19    ALT (SGPT)  10    WBC 6.46     Hemoglobin 11.6 (A)     Hematocrit 36.3     Platelets 298     Total Cholesterol   207 (A)   Triglycerides   167 (A)   HDL Cholesterol   45   LDL Cholesterol    132 (A)   (A) Abnormal value                   Lab Results   Component Value Date    SARSANTIGEN Not Detected 12/05/2022    RAPSCRN Negative 12/05/2022       Procedures        Assessment and Plan    Diagnoses and all orders for this visit:    1. BRBPR (bright red blood per rectum) (Primary)  -     Ambulatory Referral to Gastroenterology    2. Bacterial conjunctivitis of both eyes  -     erythromycin (ROMYCIN) 5 MG/GM ophthalmic ointment; Administer  into the left eye Every Night for 7 days.  Dispense: 3.5 g; Refill: 0    3. Acute cough  -     methylPREDNISolone (Medrol) 4 MG dose pack; Take as directed on package instructions.  Dispense: 21 each; Refill: 0          Skin protectant like aquaphor or vaseline to see if we can heal       There are no discontinued medications.       Follow Up   Return if symptoms worsen or fail to improve.  Patient was given instructions and counseling regarding her condition or for health maintenance advice. Please see specific information pulled into the AVS if appropriate.       Veronica Acuna, RD  02/03/23  10:13 EST

## 2023-02-28 DIAGNOSIS — I10 HYPERTENSION, UNSPECIFIED TYPE: Chronic | ICD-10-CM

## 2023-02-28 RX ORDER — LISINOPRIL 10 MG/1
10 TABLET ORAL DAILY
Qty: 30 TABLET | Refills: 0 | Status: SHIPPED | OUTPATIENT
Start: 2023-02-28 | End: 2023-03-16 | Stop reason: SDUPTHER

## 2023-02-28 NOTE — TELEPHONE ENCOUNTER
FAILED PROTOCOL. PLEASE ADVISE.    Lisinopril 10mg  X Normal serum potassium in past 12 months    LAST VISIT: 2/1/23  NEXT APPT: 3/16/23

## 2023-03-16 ENCOUNTER — OFFICE VISIT (OUTPATIENT)
Dept: INTERNAL MEDICINE | Facility: CLINIC | Age: 43
End: 2023-03-16
Payer: COMMERCIAL

## 2023-03-16 VITALS
TEMPERATURE: 97.8 F | BODY MASS INDEX: 32.94 KG/M2 | OXYGEN SATURATION: 99 % | DIASTOLIC BLOOD PRESSURE: 84 MMHG | HEART RATE: 81 BPM | HEIGHT: 62 IN | SYSTOLIC BLOOD PRESSURE: 126 MMHG | WEIGHT: 179 LBS

## 2023-03-16 DIAGNOSIS — Z13.220 SCREENING FOR LIPID DISORDERS: ICD-10-CM

## 2023-03-16 DIAGNOSIS — I10 HYPERTENSION, UNSPECIFIED TYPE: Primary | ICD-10-CM

## 2023-03-16 LAB
ALBUMIN SERPL-MCNC: 4.5 G/DL (ref 3.5–5.2)
ALBUMIN/GLOB SERPL: 1.7 G/DL
ALP SERPL-CCNC: 76 U/L (ref 39–117)
ALT SERPL W P-5'-P-CCNC: 13 U/L (ref 1–33)
ANION GAP SERPL CALCULATED.3IONS-SCNC: 10 MMOL/L (ref 5–15)
AST SERPL-CCNC: 18 U/L (ref 1–32)
BASOPHILS # BLD AUTO: 0.07 10*3/MM3 (ref 0–0.2)
BASOPHILS NFR BLD AUTO: 1.2 % (ref 0–1.5)
BILIRUB SERPL-MCNC: 0.2 MG/DL (ref 0–1.2)
BUN SERPL-MCNC: 11 MG/DL (ref 6–20)
BUN/CREAT SERPL: 12 (ref 7–25)
CALCIUM SPEC-SCNC: 9.6 MG/DL (ref 8.6–10.5)
CHLORIDE SERPL-SCNC: 102 MMOL/L (ref 98–107)
CHOLEST SERPL-MCNC: 219 MG/DL (ref 0–200)
CO2 SERPL-SCNC: 27 MMOL/L (ref 22–29)
CREAT SERPL-MCNC: 0.92 MG/DL (ref 0.57–1)
DEPRECATED RDW RBC AUTO: 44 FL (ref 37–54)
EGFRCR SERPLBLD CKD-EPI 2021: 79.4 ML/MIN/1.73
EOSINOPHIL # BLD AUTO: 0.08 10*3/MM3 (ref 0–0.4)
EOSINOPHIL NFR BLD AUTO: 1.4 % (ref 0.3–6.2)
ERYTHROCYTE [DISTWIDTH] IN BLOOD BY AUTOMATED COUNT: 14.3 % (ref 12.3–15.4)
GLOBULIN UR ELPH-MCNC: 2.7 GM/DL
GLUCOSE SERPL-MCNC: 68 MG/DL (ref 65–99)
HCT VFR BLD AUTO: 41.4 % (ref 34–46.6)
HDLC SERPL-MCNC: 56 MG/DL (ref 40–60)
HGB BLD-MCNC: 13.6 G/DL (ref 12–15.9)
IMM GRANULOCYTES # BLD AUTO: 0.01 10*3/MM3 (ref 0–0.05)
IMM GRANULOCYTES NFR BLD AUTO: 0.2 % (ref 0–0.5)
LDLC SERPL CALC-MCNC: 144 MG/DL (ref 0–100)
LDLC/HDLC SERPL: 2.53 {RATIO}
LYMPHOCYTES # BLD AUTO: 1.77 10*3/MM3 (ref 0.7–3.1)
LYMPHOCYTES NFR BLD AUTO: 30.2 % (ref 19.6–45.3)
MCH RBC QN AUTO: 27.9 PG (ref 26.6–33)
MCHC RBC AUTO-ENTMCNC: 32.9 G/DL (ref 31.5–35.7)
MCV RBC AUTO: 85 FL (ref 79–97)
MONOCYTES # BLD AUTO: 0.53 10*3/MM3 (ref 0.1–0.9)
MONOCYTES NFR BLD AUTO: 9 % (ref 5–12)
NEUTROPHILS NFR BLD AUTO: 3.4 10*3/MM3 (ref 1.7–7)
NEUTROPHILS NFR BLD AUTO: 58 % (ref 42.7–76)
NRBC BLD AUTO-RTO: 0 /100 WBC (ref 0–0.2)
PLATELET # BLD AUTO: 262 10*3/MM3 (ref 140–450)
PMV BLD AUTO: 11.8 FL (ref 6–12)
POTASSIUM SERPL-SCNC: 3.8 MMOL/L (ref 3.5–5.2)
PROT SERPL-MCNC: 7.2 G/DL (ref 6–8.5)
RBC # BLD AUTO: 4.87 10*6/MM3 (ref 3.77–5.28)
SODIUM SERPL-SCNC: 139 MMOL/L (ref 136–145)
TRIGL SERPL-MCNC: 108 MG/DL (ref 0–150)
TSH SERPL DL<=0.05 MIU/L-ACNC: 1.12 UIU/ML (ref 0.27–4.2)
VLDLC SERPL-MCNC: 19 MG/DL (ref 5–40)
WBC NRBC COR # BLD: 5.86 10*3/MM3 (ref 3.4–10.8)

## 2023-03-16 PROCEDURE — 80050 GENERAL HEALTH PANEL: CPT | Performed by: NURSE PRACTITIONER

## 2023-03-16 PROCEDURE — 80061 LIPID PANEL: CPT | Performed by: NURSE PRACTITIONER

## 2023-03-16 PROCEDURE — 99214 OFFICE O/P EST MOD 30 MIN: CPT | Performed by: NURSE PRACTITIONER

## 2023-03-16 RX ORDER — LISINOPRIL 10 MG/1
10 TABLET ORAL DAILY
Qty: 90 TABLET | Refills: 1 | Status: SHIPPED | OUTPATIENT
Start: 2023-03-16 | End: 2023-03-31 | Stop reason: SINTOL

## 2023-03-16 RX ORDER — LISINOPRIL 10 MG/1
10 TABLET ORAL DAILY
Qty: 90 TABLET | Refills: 1 | Status: SHIPPED | OUTPATIENT
Start: 2023-03-16 | End: 2023-03-16

## 2023-03-16 NOTE — PROGRESS NOTES
"Chief Complaint  Hypertension (3 month follow-up)    Subjective          Lidia Davis presents to [facility]  History of Present Illness  Hypertension  This is a chronic problem. The problem is uncontrolled. Pertinent negatives include no anxiety, blurred vision, chest pain, headaches, malaise/fatigue, neck pain, orthopnea, palpitations, peripheral edema, PND, shortness of breath or sweats. Past treatments include diuretics. The current treatment provides mild improvement. Compliance problems include diet and exercise.  There is no history of angina, kidney disease, CAD/MI, CVA, heart failure, left ventricular hypertrophy, PVD or retinopathy.        Current Outpatient Medications   Medication Instructions   • fluticasone (FLONASE) 50 MCG/ACT nasal spray 2 sprays, Nasal, Daily   • Iron (Ferrous Sulfate) 325 mg, Oral, Daily   • lisinopril (PRINIVIL,ZESTRIL) 10 mg, Oral, Daily   • potassium chloride 10 MEQ CR tablet 10 mEq, Daily       The following portions of the patient's history were reviewed and updated as appropriate: allergies, current medications, past family history, past medical history, past social history, past surgical history, and problem list.    Objective   Vital Signs:   /84 (BP Location: Left arm, Patient Position: Sitting, Cuff Size: Adult)   Pulse 81   Temp 97.8 °F (36.6 °C) (Temporal)   Ht 157.5 cm (62\")   Wt 81.2 kg (179 lb)   SpO2 99%   BMI 32.74 kg/m²     Wt Readings from Last 3 Encounters:   03/16/23 81.2 kg (179 lb)   02/01/23 81.4 kg (179 lb 6.4 oz)   12/14/22 79.4 kg (175 lb)     BP Readings from Last 3 Encounters:   03/16/23 126/84   02/01/23 128/88   12/14/22 120/69     Physical Exam   Appearance: No acute distress, well-nourished  Head: normocephalic, atraumatic  Eyes: extraocular movements intact, no scleral icterus, no conjunctival injection  Ears, Nose, and Throat: external ears normal, nares patent, moist mucous " membranes  Cardiovascular: regular rate and rhythm. no murmurs, rubs, or gallops. no edema  Respiratory: breathing comfortably, symmetric chest rise, clear to auscultation bilaterally. No wheezes, rales, or rhonchi.  Neuro: alert and oriented to time, place, and person. Normal gait  Psych: normal mood and affect     Result Review :   The following data was reviewed by: RD Moya on 03/16/2023:  Common labs    Common Labs 8/25/22 8/25/22 8/25/22    1439 1439 1439   Glucose  79    BUN  9    Creatinine  0.89    Sodium  140    Potassium  3.1 (A)    Chloride  99    Calcium  9.8    Albumin  4.30    Total Bilirubin  0.2    Alkaline Phosphatase  75    AST (SGOT)  19    ALT (SGPT)  10    WBC 6.46     Hemoglobin 11.6 (A)     Hematocrit 36.3     Platelets 298     Total Cholesterol   207 (A)   Triglycerides   167 (A)   HDL Cholesterol   45   LDL Cholesterol    132 (A)   (A) Abnormal value                   Lab Results   Component Value Date    SARSANTIGEN Not Detected 12/05/2022    RAPSCRN Negative 12/05/2022       Procedures        Assessment and Plan    Diagnoses and all orders for this visit:    1. Hypertension, unspecified type (Primary)  Comments:  continue current dose of lisinopril; DASH diet and exercise  Assessment & Plan:  Hypertension is improving with treatment.  Continue current treatment regimen.  Dietary sodium restriction.  Weight loss.  Regular aerobic exercise.  Blood pressure will be reassessed in 3 months.    Orders:  -     Discontinue: lisinopril (PRINIVIL,ZESTRIL) 10 MG tablet; Take 1 tablet by mouth Daily.  Dispense: 90 tablet; Refill: 1  -     TSH Rfx On Abnormal To Free T4  -     Comprehensive Metabolic Panel  -     CBC & Differential  -     lisinopril (PRINIVIL,ZESTRIL) 10 MG tablet; Take 1 tablet by mouth Daily.  Dispense: 90 tablet; Refill: 1    2. Screening for lipid disorders  -     Lipid Panel        Medications Discontinued During This Encounter   Medication Reason   •  methylPREDNISolone (Medrol) 4 MG dose pack    • mupirocin (BACTROBAN) 2 % ointment    • Clotrimazole 1 % ointment    • lisinopril (PRINIVIL,ZESTRIL) 10 MG tablet Reorder   • lisinopril (PRINIVIL,ZESTRIL) 10 MG tablet           Follow Up   No follow-ups on file.  Patient was given instructions and counseling regarding her condition or for health maintenance advice. Please see specific information pulled into the AVS if appropriate.       Veronica Acuna, RD  03/16/23  09:35 EDT

## 2023-03-20 ENCOUNTER — TELEPHONE (OUTPATIENT)
Dept: GASTROENTEROLOGY | Facility: CLINIC | Age: 43
End: 2023-03-20
Payer: COMMERCIAL

## 2023-03-20 NOTE — TELEPHONE ENCOUNTER
"Contacted patient to complete DA call for rectal bleeding, patient states she is experiencing rectal bleeding, diarrhea and constipation patients states \"one day I have extreme constipation then the next day it is very loose diarrhea\" I advised due to symptoms we would need to schedule her with an APRN, patient was scheduled for 6/1/2023 with Tracie Pak APRN, explained to patient why she needed an in office visit, patient stated understanding. Patients appointment was placed onto the cancellation list. Patient is aware.   "

## 2023-03-31 ENCOUNTER — TELEPHONE (OUTPATIENT)
Dept: INTERNAL MEDICINE | Facility: CLINIC | Age: 43
End: 2023-03-31
Payer: COMMERCIAL

## 2023-03-31 NOTE — TELEPHONE ENCOUNTER
Regarding: Lisinopril cough  Contact: [phone]  ----- Message from DR Moya sent at 3/31/2023  2:08 PM EDT -----  Please call patient .Stop lisinopril. I sent in losartan to the pharmacy.   Schedule 4 week f/u so we can make sure BP is doing well on new med     ----- Message from Lidia Davis to Veronica Acuna APRN sent at 3/28/2023  8:04 AM -----   When I came in a couple of weeks ago I put off talking about my continued cough because I had both boys with me and was more concerned about them at the time. However I continue to have a dry, unproductive cough that wakes me up multiple times every night. I am pretty sure the cough is from the lisinopril. I wish it wasn’t as it has helped my blood pressure but I can’t live with this cough. Can we try something else? Maybe a different dose of amlodipine?

## 2023-03-31 NOTE — TELEPHONE ENCOUNTER
Spoke with patient. Verified .   Made aware to stop lisinopril and start new medicine.   Scheduled for follow-up as well

## 2023-04-26 ENCOUNTER — TELEPHONE (OUTPATIENT)
Dept: GASTROENTEROLOGY | Facility: CLINIC | Age: 43
End: 2023-04-26
Payer: COMMERCIAL

## 2023-04-26 NOTE — TELEPHONE ENCOUNTER
I contacted the patient in regards to an earlier appointment for today. Patient declined and stated that she was not able to come in today. I advised that we would keep her on the cancellation list in case we had anything else come up. Patient verbalized understanding.

## 2023-05-02 ENCOUNTER — OFFICE VISIT (OUTPATIENT)
Dept: INTERNAL MEDICINE | Facility: CLINIC | Age: 43
End: 2023-05-02
Payer: COMMERCIAL

## 2023-05-02 VITALS
DIASTOLIC BLOOD PRESSURE: 83 MMHG | HEART RATE: 70 BPM | HEIGHT: 62 IN | TEMPERATURE: 98.2 F | OXYGEN SATURATION: 98 % | SYSTOLIC BLOOD PRESSURE: 132 MMHG | WEIGHT: 187 LBS | BODY MASS INDEX: 34.41 KG/M2

## 2023-05-02 DIAGNOSIS — I10 HYPERTENSION, UNSPECIFIED TYPE: ICD-10-CM

## 2023-05-02 RX ORDER — LOSARTAN POTASSIUM 25 MG/1
25 TABLET ORAL DAILY
Qty: 90 TABLET | Refills: 1 | Status: SHIPPED | OUTPATIENT
Start: 2023-05-02

## 2023-05-02 NOTE — ASSESSMENT & PLAN NOTE
Hypertension is improving with treatment.  Continue current treatment regimen.  Dietary sodium restriction.  Weight loss.  Regular aerobic exercise.  Blood pressure will be reassessed in 4 weeks.

## 2023-05-02 NOTE — PROGRESS NOTES
"Chief Complaint  Hypertension (4 week follow-up)    Subjective          Lidia Davis presents to [facility]  History of Present Illness    Hypertension  This is a chronic problem. The problem is uncontrolled. Pertinent negatives include no anxiety, blurred vision, chest pain, headaches, malaise/fatigue, neck pain, orthopnea, palpitations, peripheral edema, PND, shortness of breath or sweats. Past treatments include diuretics. The current treatment provides mild improvement. Compliance problems include diet and exercise.  There is no history of angina, kidney disease, CAD/MI, CVA, heart failure, left ventricular hypertrophy, PVD or retinopathy.        Current Outpatient Medications   Medication Instructions   • fluticasone (FLONASE) 50 MCG/ACT nasal spray 2 sprays, Nasal, Daily   • Iron (Ferrous Sulfate) 325 mg, Oral, Daily   • losartan (COZAAR) 25 mg, Oral, Daily       The following portions of the patient's history were reviewed and updated as appropriate: allergies, current medications, past family history, past medical history, past social history, past surgical history, and problem list.    Objective   Vital Signs:   /83 (BP Location: Left arm, Patient Position: Sitting, Cuff Size: Adult)   Pulse 70   Temp 98.2 °F (36.8 °C) (Temporal)   Ht 157.5 cm (62\")   Wt 84.8 kg (187 lb)   SpO2 98%   BMI 34.20 kg/m²     Wt Readings from Last 3 Encounters:   05/02/23 84.8 kg (187 lb)   03/16/23 81.2 kg (179 lb)   02/01/23 81.4 kg (179 lb 6.4 oz)     BP Readings from Last 3 Encounters:   05/02/23 132/83   03/16/23 126/84   02/01/23 128/88     Physical Exam   Appearance: No acute distress, well-nourished  Head: normocephalic, atraumatic  Eyes: extraocular movements intact, no scleral icterus, no conjunctival injection  Ears, Nose, and Throat: external ears normal, nares patent, moist mucous membranes  Cardiovascular: regular rate and rhythm. no murmurs, rubs, or " gallops. no edema  Respiratory: breathing comfortably, symmetric chest rise, clear to auscultation bilaterally. No wheezes, rales, or rhonchi.  Neuro: alert and oriented to time, place, and person. Normal gait  Psych: normal mood and affect     Result Review :   The following data was reviewed by: RD Moya on 05/02/2023:  Common labs        8/25/2022    14:39 3/16/2023    09:10   Common Labs   Glucose 79   68     BUN 9   11     Creatinine 0.89   0.92     Sodium 140   139     Potassium 3.1   3.8     Chloride 99   102     Calcium 9.8   9.6     Albumin 4.30   4.5     Total Bilirubin 0.2   0.2     Alkaline Phosphatase 75   76     AST (SGOT) 19   18     ALT (SGPT) 10   13     WBC 6.46   5.86     Hemoglobin 11.6   13.6     Hematocrit 36.3   41.4     Platelets 298   262     Total Cholesterol 207   219     Triglycerides 167   108     HDL Cholesterol 45   56     LDL Cholesterol  132   144              Lab Results   Component Value Date    SARSANTIGEN Not Detected 12/05/2022    RAPSCRN Negative 12/05/2022       Procedures        Assessment and Plan    Diagnoses and all orders for this visit:    1. Hypertension, unspecified type  Assessment & Plan:  Hypertension is improving with treatment.  Continue current treatment regimen.  Dietary sodium restriction.  Weight loss.  Regular aerobic exercise.  Blood pressure will be reassessed in 4 weeks.    Orders:  -     losartan (Cozaar) 25 MG tablet; Take 1 tablet by mouth Daily.  Dispense: 90 tablet; Refill: 1        Medications Discontinued During This Encounter   Medication Reason   • potassium chloride 10 MEQ CR tablet    • losartan (Cozaar) 25 MG tablet Reorder          Follow Up   Return in about 3 months (around 8/2/2023) for Hypertension.  Patient was given instructions and counseling regarding her condition or for health maintenance advice. Please see specific information pulled into the AVS if appropriate.       RD Moya  05/02/23  11:18  EDT

## 2023-06-01 ENCOUNTER — OFFICE VISIT (OUTPATIENT)
Dept: GASTROENTEROLOGY | Facility: CLINIC | Age: 43
End: 2023-06-01

## 2023-06-01 VITALS
SYSTOLIC BLOOD PRESSURE: 135 MMHG | HEART RATE: 80 BPM | HEIGHT: 62 IN | DIASTOLIC BLOOD PRESSURE: 75 MMHG | BODY MASS INDEX: 34.85 KG/M2 | WEIGHT: 189.4 LBS

## 2023-06-01 DIAGNOSIS — K62.5 RECTAL BLEEDING: Primary | ICD-10-CM

## 2023-06-01 DIAGNOSIS — R19.4 CHANGE IN BOWEL HABITS: ICD-10-CM

## 2023-06-01 PROCEDURE — 99204 OFFICE O/P NEW MOD 45 MIN: CPT | Performed by: NURSE PRACTITIONER

## 2023-06-01 RX ORDER — SODIUM, POTASSIUM,MAG SULFATES 17.5-3.13G
2 SOLUTION, RECONSTITUTED, ORAL ORAL ONCE
Qty: 354 ML | Refills: 0 | Status: SHIPPED | OUTPATIENT
Start: 2023-06-01 | End: 2023-06-01

## 2023-06-01 NOTE — PROGRESS NOTES
Chief Complaint        Rectal Bleeding and Constipation    History of Present Illness      Lidia Davis is a 42 y.o. female who presents to [facility] as a new patient for rectal bleeding.    She admits since last December her bowels are not been right. She started at the end of December with watery diarrhea out of nowhere. Then she reports in January of this year she started having constipation alternating with diarrhea. Before last winter she would have a normal BM most days. Normally she would go EOD. Now she is very irregular. She also thinks now she is struggling with hemorrhoids. She will have hard stools or have loose diarrhea. She has cut out soda since January. She did try miralax but caused too much diarrhea.    Last time she had any rectal bleeding was this past March. No bleeding since.       She denies any dysphagia, reflux, abd pain, N&V or melena. Good appetite. Weight is up 10 lbs since Feb of this year.     She denies any dietary changes or new medications. Denies any antibiotics. She denies any fiber or probiotics. She does have hx ERWIN secondary menstrual periods and does not take her oral iron now. Most recent Hgb stable.    EGD/colonoscopy---never had     GI family history---Denies any significant     Results       Result Review :   The following data was reviewed by: RD Moulton on 06/01/2023     CMP        8/25/2022    14:39 3/16/2023    09:10   CMP   Glucose 79   68     BUN 9   11     Creatinine 0.89   0.92     EGFR 83.1   79.4     Sodium 140   139     Potassium 3.1   3.8     Chloride 99   102     Calcium 9.8   9.6     Total Protein 7.1   7.2     Albumin 4.30   4.5     Globulin 2.8   2.7     Total Bilirubin 0.2   0.2     Alkaline Phosphatase 75   76     AST (SGOT) 19   18     ALT (SGPT) 10   13     Albumin/Globulin Ratio 1.5   1.7     BUN/Creatinine Ratio 10.1   12.0     Anion Gap 12.0   10.0       CBC        8/25/2022    14:39 3/16/2023     "09:10   CBC   WBC 6.46   5.86     RBC 4.77   4.87     Hemoglobin 11.6   13.6     Hematocrit 36.3   41.4     MCV 76.1   85.0     MCH 24.3   27.9     MCHC 32.0   32.9     RDW 14.4   14.3     Platelets 298   262                  Past Medical History       Past Medical History:   Diagnosis Date   • Allergic 2010   • Anemia 8/22   • Headache 2005   • Hypertension 2016       Past Surgical History:   Procedure Laterality Date   • EAR TUBES           Current Outpatient Medications:   •  fluticasone (FLONASE) 50 MCG/ACT nasal spray, 2 sprays into the nostril(s) as directed by provider Daily., Disp: 16 g, Rfl: 0  •  Iron, Ferrous Sulfate, 325 (65 Fe) MG tablet, Take 325 mg by mouth Daily., Disp: 90 tablet, Rfl: 1  •  losartan (Cozaar) 25 MG tablet, Take 1 tablet by mouth Daily., Disp: 90 tablet, Rfl: 1  •  sodium-potassium-magnesium sulfates (SUPREP) 17.5-3.13-1.6 GM/177ML solution oral solution, Take 2 bottles by mouth 1 (One) Time for 1 dose. Take as directed, Disp: 354 mL, Rfl: 0     No Known Allergies    Family History   Problem Relation Age of Onset   • Hyperlipidemia Mother    • Drug abuse Father    • Alcohol abuse Father    • Cancer Paternal Grandmother    • Learning disabilities Sister         Social History     Social History Narrative   • Not on file       Objective       Objective     Vital Signs:   /75 (BP Location: Left arm, Patient Position: Sitting, Cuff Size: Adult)   Pulse 80   Ht 157.5 cm (62\")   Wt 85.9 kg (189 lb 6.4 oz)   BMI 34.64 kg/m²     Body mass index is 34.64 kg/m².    Review of Systems   Constitutional: Negative for appetite change, fatigue, fever and unexpected weight change.   HENT: Negative for trouble swallowing.    Respiratory: Negative for cough, choking, chest tightness, shortness of breath, wheezing and stridor.    Cardiovascular: Negative for chest pain, palpitations and leg swelling.   Gastrointestinal: Positive for anal bleeding, constipation and diarrhea. Negative for " abdominal distention, abdominal pain, blood in stool, nausea, rectal pain and vomiting.        Physical Exam  Constitutional:       General: She is not in acute distress.     Appearance: She is well-developed. She is not ill-appearing.   HENT:      Head: Normocephalic.   Eyes:      Pupils: Pupils are equal, round, and reactive to light.   Cardiovascular:      Rate and Rhythm: Normal rate and regular rhythm.      Heart sounds: Normal heart sounds.   Pulmonary:      Effort: Pulmonary effort is normal.      Breath sounds: Normal breath sounds.   Abdominal:      General: Bowel sounds are normal. There is no distension.      Palpations: Abdomen is soft. There is no mass.      Tenderness: There is no abdominal tenderness. There is no guarding or rebound.      Hernia: No hernia is present.   Musculoskeletal:         General: Normal range of motion.   Skin:     General: Skin is warm and dry.   Neurological:      Mental Status: She is alert and oriented to person, place, and time.   Psychiatric:         Speech: Speech normal.         Behavior: Behavior normal.         Judgment: Judgment normal.              Assessment & Plan          Assessment and Plan    Diagnoses and all orders for this visit:    1. Rectal bleeding (Primary)  -     Case Request; Standing  -     Follow Anesthesia Guidelines / Protocol; Future  -     Obtain Informed Consent; Future  -     sodium-potassium-magnesium sulfates (SUPREP) 17.5-3.13-1.6 GM/177ML solution oral solution; Take 2 bottles by mouth 1 (One) Time for 1 dose. Take as directed  Dispense: 354 mL; Refill: 0  -     Case Request    2. Change in bowel habits  -     Case Request; Standing  -     Follow Anesthesia Guidelines / Protocol; Future  -     Obtain Informed Consent; Future  -     sodium-potassium-magnesium sulfates (SUPREP) 17.5-3.13-1.6 GM/177ML solution oral solution; Take 2 bottles by mouth 1 (One) Time for 1 dose. Take as directed  Dispense: 354 mL; Refill: 0  -     Case  Request    Other orders  -     Follow Anesthesia Guidelines / Protocol; Standing  -     Verify NPO; Standing  -     Verify Bowel Prep Was Successful; Standing  -     Give Tap Water Enema If Bowel Prep Insufficient; Standing      Reviewed medical history with her today.  Given her history and current symptoms recommend colonoscopy with Dr. Rodriguez for further evaluation.  Patient is agreeable to the scope.  For now would like her to start a daily fiber supplement.  Handout given today.  Continue a high-fiber diet.  Patient to call the office in 1 to 2 weeks with an update.  Patient to follow-up with me after her scope.  Patient is agreeable to the plan.    Surgical Risk and Benefits discussed: Possible risks/complications, benefits, and alternatives to surgical or invasive procedure have been explained to patient and/or legal guardian; risks include bleeding, infection, and perforation. Patient has been evaluated and can tolerate anesthesia and/or sedation. Risks, benefits, and alternatives to anesthesia and sedation have been explained to patient and/or legal guardian.          Follow Up       Follow Up   Return for F/U AFTER PROCEDURE.  Patient was given instructions and counseling regarding her condition or for health maintenance advice. Please see specific information pulled into the AVS if appropriate.

## 2023-06-02 ENCOUNTER — PATIENT ROUNDING (BHMG ONLY) (OUTPATIENT)
Dept: GASTROENTEROLOGY | Facility: CLINIC | Age: 43
End: 2023-06-02

## 2023-06-02 NOTE — PROGRESS NOTES
6/2/2023      Hello, may I speak with Lidia Davis     My name is Sayda, I am the Clinical Coordinator. I am calling from [facility]. I show that you had a recent visit with RD Saxena.    Before we get started may I verify your date of birth? 1980    I am calling to officially welcome you to our practice and ask about your recent visit. Is this a good time to talk? No     Tell me about your visit with us. What things went well?         We're always looking for ways to make our patients' experiences even better. Do you have recommendations on ways we may improve?    Overall were you satisfied with your first visit to our practice?    I appreciate you taking the time to speak with me today. Is there anything else I can do for you?    I am glad to hear that you had a very good visit and I appreciate you taking the time to provide feedback on this call. We would greatly appreciate you filling out a survey if you receive one in the mail, email or text. This is a great opportunity to provide any additional feedback that you may think of after this call as well.       Thank you, and have a great day.

## 2023-08-02 ENCOUNTER — OFFICE VISIT (OUTPATIENT)
Dept: INTERNAL MEDICINE | Facility: CLINIC | Age: 43
End: 2023-08-02
Payer: COMMERCIAL

## 2023-08-02 VITALS
DIASTOLIC BLOOD PRESSURE: 80 MMHG | SYSTOLIC BLOOD PRESSURE: 124 MMHG | OXYGEN SATURATION: 98 % | WEIGHT: 188.25 LBS | HEIGHT: 62 IN | TEMPERATURE: 97.7 F | BODY MASS INDEX: 34.64 KG/M2 | HEART RATE: 78 BPM

## 2023-08-02 DIAGNOSIS — I10 HYPERTENSION, UNSPECIFIED TYPE: Primary | ICD-10-CM

## 2023-08-02 PROCEDURE — 99213 OFFICE O/P EST LOW 20 MIN: CPT | Performed by: NURSE PRACTITIONER

## 2023-08-02 RX ORDER — LOSARTAN POTASSIUM 25 MG/1
25 TABLET ORAL DAILY
Qty: 90 TABLET | Refills: 1 | Status: SHIPPED | OUTPATIENT
Start: 2023-08-02

## 2023-08-09 RX ORDER — SODIUM, POTASSIUM,MAG SULFATES 17.5-3.13G
1 SOLUTION, RECONSTITUTED, ORAL ORAL EVERY 12 HOURS
Qty: 354 ML | Refills: 0 | Status: SHIPPED | OUTPATIENT
Start: 2023-08-09

## 2023-08-09 NOTE — TELEPHONE ENCOUNTER
I have called and spoke to patient with an upcoming procedure reminder. Patient confirmed.   Pt is requesting prep resent to pharmacy.

## 2023-08-16 NOTE — PRE-PROCEDURE INSTRUCTIONS
"Instructed on date and arrival time of 0730. Come to entrance \"C\". Must have  over age 18 to drive home.  May have two visitors; however, children under 12 must stay in waiting room.  Discussed clear liquid diet (no red or purple) and bowel prep.  May take medications as usual except for blood thinners, diabetic medications, and weight loss medications.  Bring list of medications.  Verbalized understanding of instructions given.  Phone number given for questions or concerns.  "

## 2023-08-23 ENCOUNTER — HOSPITAL ENCOUNTER (OUTPATIENT)
Facility: HOSPITAL | Age: 43
Setting detail: HOSPITAL OUTPATIENT SURGERY
Discharge: HOME | End: 2023-08-23
Attending: INTERNAL MEDICINE | Admitting: INTERNAL MEDICINE
Payer: COMMERCIAL

## 2023-08-23 ENCOUNTER — ANESTHESIA EVENT (OUTPATIENT)
Dept: GASTROENTEROLOGY | Facility: HOSPITAL | Age: 43
End: 2023-08-23
Payer: COMMERCIAL

## 2023-08-23 ENCOUNTER — ANESTHESIA (OUTPATIENT)
Dept: GASTROENTEROLOGY | Facility: HOSPITAL | Age: 43
End: 2023-08-23
Payer: COMMERCIAL

## 2023-08-23 VITALS
TEMPERATURE: 97.8 F | RESPIRATION RATE: 19 BRPM | SYSTOLIC BLOOD PRESSURE: 127 MMHG | HEART RATE: 69 BPM | BODY MASS INDEX: 33.55 KG/M2 | DIASTOLIC BLOOD PRESSURE: 83 MMHG | OXYGEN SATURATION: 100 % | WEIGHT: 183.42 LBS

## 2023-08-23 DIAGNOSIS — K62.5 RECTAL BLEEDING: ICD-10-CM

## 2023-08-23 DIAGNOSIS — R19.4 CHANGE IN BOWEL HABITS: ICD-10-CM

## 2023-08-23 LAB — B-HCG UR QL: NEGATIVE

## 2023-08-23 PROCEDURE — 25010000002 PROPOFOL 10 MG/ML EMULSION: Performed by: NURSE ANESTHETIST, CERTIFIED REGISTERED

## 2023-08-23 PROCEDURE — 45380 COLONOSCOPY AND BIOPSY: CPT | Performed by: INTERNAL MEDICINE

## 2023-08-23 PROCEDURE — 45385 COLONOSCOPY W/LESION REMOVAL: CPT | Performed by: INTERNAL MEDICINE

## 2023-08-23 PROCEDURE — 81025 URINE PREGNANCY TEST: CPT | Performed by: INTERNAL MEDICINE

## 2023-08-23 PROCEDURE — 88305 TISSUE EXAM BY PATHOLOGIST: CPT | Performed by: INTERNAL MEDICINE

## 2023-08-23 RX ORDER — LIDOCAINE HYDROCHLORIDE 5 MG/ML
INJECTION, SOLUTION INFILTRATION; INTRAVENOUS AS NEEDED
Status: DISCONTINUED | OUTPATIENT
Start: 2023-08-23 | End: 2023-08-23 | Stop reason: SURG

## 2023-08-23 RX ORDER — PROPOFOL 10 MG/ML
VIAL (ML) INTRAVENOUS AS NEEDED
Status: DISCONTINUED | OUTPATIENT
Start: 2023-08-23 | End: 2023-08-23 | Stop reason: SURG

## 2023-08-23 RX ORDER — SODIUM CHLORIDE, SODIUM LACTATE, POTASSIUM CHLORIDE, CALCIUM CHLORIDE 600; 310; 30; 20 MG/100ML; MG/100ML; MG/100ML; MG/100ML
30 INJECTION, SOLUTION INTRAVENOUS CONTINUOUS
Status: DISCONTINUED | OUTPATIENT
Start: 2023-08-23 | End: 2023-08-23 | Stop reason: HOSPADM

## 2023-08-23 RX ORDER — HYDROCORTISONE 25 MG/G
CREAM TOPICAL 2 TIMES DAILY
Qty: 28 G | Refills: 0 | Status: SHIPPED | OUTPATIENT
Start: 2023-08-23 | End: 2023-09-06

## 2023-08-23 RX ADMIN — SODIUM CHLORIDE, POTASSIUM CHLORIDE, SODIUM LACTATE AND CALCIUM CHLORIDE 30 ML/HR: 600; 310; 30; 20 INJECTION, SOLUTION INTRAVENOUS at 08:27

## 2023-08-23 RX ADMIN — PROPOFOL 150 MCG/KG/MIN: 10 INJECTION, EMULSION INTRAVENOUS at 08:54

## 2023-08-23 RX ADMIN — PROPOFOL 70 MG: 10 INJECTION, EMULSION INTRAVENOUS at 08:54

## 2023-08-23 RX ADMIN — PROPOFOL 70 MG: 10 INJECTION, EMULSION INTRAVENOUS at 09:05

## 2023-08-23 RX ADMIN — PROPOFOL 50 MG: 10 INJECTION, EMULSION INTRAVENOUS at 08:56

## 2023-08-23 RX ADMIN — LIDOCAINE HYDROCHLORIDE 50 MG: 5 INJECTION, SOLUTION INFILTRATION; INTRAVENOUS at 08:54

## 2023-08-23 NOTE — ANESTHESIA POSTPROCEDURE EVALUATION
Patient: Lidia Davis    Procedure Summary       Date: 08/23/23 Room / Location: [facility] ENDOSCOPY 3 / [facility] ENDOSCOPY    Anesthesia Start: 0850 Anesthesia Stop: 0916    Procedure: COLONOSCOPY WITH BIOPSIES, POLYPECTOMIES Diagnosis:       Rectal bleeding      Change in bowel habits      (Rectal bleeding [K62.5])      (Change in bowel habits [R19.4])    Surgeons: Laurie Rodriguez MD Provider: Ruddy Schmidt MD    Anesthesia Type: general ASA Status: 2            Anesthesia Type: general    Vitals  Vitals Value Taken Time   /83 08/23/23 0932   Temp 36.6 øC (97.8 øF) 08/23/23 0930   Pulse 68 08/23/23 0932   Resp 19 08/23/23 0930   SpO2 100 % 08/23/23 0932   Vitals shown include unvalidated device data.        Post Anesthesia Care and Evaluation    Patient location during evaluation: bedside  Patient participation: complete - patient participated  Level of consciousness: awake  Pain management: adequate    Airway patency: patent  PONV Status: none  Cardiovascular status: acceptable and stable  Respiratory status: acceptable  Hydration status: acceptable    Comments: An Anesthesiologist personally participated in the most demanding procedures (including induction and emergence if applicable) in the anesthesia plan, monitored the course of anesthesia administration at frequent intervals and remained physically present and available for immediate diagnosis and treatment of emergencies.

## 2023-08-23 NOTE — ANESTHESIA PREPROCEDURE EVALUATION
Anesthesia Evaluation     Patient summary reviewed and Nursing notes reviewed   no history of anesthetic complications:   NPO Solid Status: > 8 hours  NPO Liquid Status: > 2 hours           Airway   Mallampati: I  TM distance: >3 FB  Neck ROM: full  No difficulty expected  Dental      Pulmonary - negative pulmonary ROS and normal exam    breath sounds clear to auscultation  Cardiovascular - normal exam  Exercise tolerance: good (4-7 METS)    Rhythm: regular  Rate: normal    (+) hypertension      Neuro/Psych  (+) headaches  GI/Hepatic/Renal/Endo    (+) GI bleeding     Musculoskeletal (-) negative ROS    Abdominal    Substance History - negative use     OB/GYN negative ob/gyn ROS         Other - negative ROS       ROS/Med Hx Other: PAT Nursing Notes unavailable.                   Anesthesia Plan    ASA 2     general     (Total IV Anesthesia    Patient understands anesthesia not responsible for dental damage.  )  intravenous induction     Anesthetic plan, risks, benefits, and alternatives have been provided, discussed and informed consent has been obtained with: patient.    Plan discussed with CRNA.      CODE STATUS:

## 2023-08-23 NOTE — H&P
Pre Procedure History & Physical    Chief Complaint:   Rectal bleeding    Subjective     HPI:   43 yo F here for eval of rectal bleeding.    Past Medical History:   Past Medical History:   Diagnosis Date    Allergic 2010    Anemia 8/22    Headache 2005    Hypertension 2016       Past Surgical History:  Past Surgical History:   Procedure Laterality Date    EAR TUBES         Family History:  Family History   Problem Relation Age of Onset    Hyperlipidemia Mother     Drug abuse Father     Alcohol abuse Father     Cancer Paternal Grandmother     Learning disabilities Sister        Social History:   reports that she has never smoked. She has never used smokeless tobacco. She reports that she does not currently use alcohol. She reports that she does not use drugs.    Medications:   Medications Prior to Admission   Medication Sig Dispense Refill Last Dose    fluticasone (FLONASE) 50 MCG/ACT nasal spray 2 sprays into the nostril(s) as directed by provider Daily. 16 g 0     Iron, Ferrous Sulfate, 325 (65 Fe) MG tablet Take 325 mg by mouth Daily. (Patient not taking: Reported on 8/2/2023) 90 tablet 1     losartan (Cozaar) 25 MG tablet Take 1 tablet by mouth Daily. 90 tablet 1     polyethylene glycol (GoLYTELY) 236 g solution Starting at noon on day prior to procedure, drink 8 ounces every 30 minutes until all gone or stools are clear. May add flavor packet. 4000 mL 0     sodium-potassium-magnesium sulfates (Suprep Bowel Prep Kit) 17.5-3.13-1.6 GM/177ML solution oral solution Take 1 bottle by mouth Every 12 (Twelve) Hours. 354 mL 0        Allergies:  Patient has no known allergies.    ROS:    Pertinent items are noted in HPI     Objective     Weight 83.2 kg (183 lb 6.8 oz).    Physical Exam   Constitutional: Pt is oriented to person, place, and time and well-developed, well-nourished, and in no distress.   Mouth/Throat: Oropharynx is clear and moist.   Neck: Normal range of motion.   Cardiovascular: Normal rate, regular rhythm  and normal heart sounds.    Pulmonary/Chest: Effort normal and breath sounds normal.   Abdominal: Soft. Nontender  Skin: Skin is warm and dry.   Psychiatric: Mood, memory, affect and judgment normal.     Assessment & Plan     Diagnosis:  Rectal bleeding    Anticipated Surgical Procedure:  Colonoscopy    The risks, benefits, and alternatives of this procedure have been discussed with the patient or the responsible party- the patient understands and agrees to proceed.

## 2023-08-24 LAB
CYTO UR: NORMAL
LAB AP CASE REPORT: NORMAL
LAB AP CLINICAL INFORMATION: NORMAL
PATH REPORT.FINAL DX SPEC: NORMAL
PATH REPORT.GROSS SPEC: NORMAL

## 2023-12-30 DIAGNOSIS — I10 HYPERTENSION, UNSPECIFIED TYPE: ICD-10-CM

## 2024-01-02 RX ORDER — LOSARTAN POTASSIUM 25 MG/1
25 TABLET ORAL DAILY
Qty: 90 TABLET | Refills: 1 | Status: SHIPPED | OUTPATIENT
Start: 2024-01-02

## 2024-01-16 ENCOUNTER — OFFICE VISIT (OUTPATIENT)
Dept: INTERNAL MEDICINE | Facility: CLINIC | Age: 44
End: 2024-01-16
Payer: COMMERCIAL

## 2024-01-16 VITALS
OXYGEN SATURATION: 98 % | WEIGHT: 182 LBS | HEIGHT: 62 IN | TEMPERATURE: 98.5 F | SYSTOLIC BLOOD PRESSURE: 140 MMHG | BODY MASS INDEX: 33.49 KG/M2 | HEART RATE: 87 BPM | DIASTOLIC BLOOD PRESSURE: 85 MMHG

## 2024-01-16 DIAGNOSIS — I10 HYPERTENSION, UNSPECIFIED TYPE: Primary | ICD-10-CM

## 2024-01-16 DIAGNOSIS — Z13.220 SCREENING FOR LIPID DISORDERS: ICD-10-CM

## 2024-01-16 DIAGNOSIS — D50.9 IRON DEFICIENCY ANEMIA, UNSPECIFIED IRON DEFICIENCY ANEMIA TYPE: Chronic | ICD-10-CM

## 2024-01-16 LAB
ALBUMIN SERPL-MCNC: 4.3 G/DL (ref 3.5–5.2)
ALBUMIN/GLOB SERPL: 1.5 G/DL
ALP SERPL-CCNC: 76 U/L (ref 39–117)
ALT SERPL W P-5'-P-CCNC: 8 U/L (ref 1–33)
ANION GAP SERPL CALCULATED.3IONS-SCNC: 12.5 MMOL/L (ref 5–15)
AST SERPL-CCNC: 14 U/L (ref 1–32)
BASOPHILS # BLD AUTO: 0.06 10*3/MM3 (ref 0–0.2)
BASOPHILS NFR BLD AUTO: 0.8 % (ref 0–1.5)
BILIRUB SERPL-MCNC: <0.2 MG/DL (ref 0–1.2)
BUN SERPL-MCNC: 12 MG/DL (ref 6–20)
BUN/CREAT SERPL: 12.6 (ref 7–25)
CALCIUM SPEC-SCNC: 9.1 MG/DL (ref 8.6–10.5)
CHLORIDE SERPL-SCNC: 102 MMOL/L (ref 98–107)
CHOLEST SERPL-MCNC: 218 MG/DL (ref 0–200)
CO2 SERPL-SCNC: 22.5 MMOL/L (ref 22–29)
CREAT SERPL-MCNC: 0.95 MG/DL (ref 0.57–1)
DEPRECATED RDW RBC AUTO: 41.1 FL (ref 37–54)
EGFRCR SERPLBLD CKD-EPI 2021: 75.9 ML/MIN/1.73
EOSINOPHIL # BLD AUTO: 0.07 10*3/MM3 (ref 0–0.4)
EOSINOPHIL NFR BLD AUTO: 1 % (ref 0.3–6.2)
ERYTHROCYTE [DISTWIDTH] IN BLOOD BY AUTOMATED COUNT: 13.2 % (ref 12.3–15.4)
GLOBULIN UR ELPH-MCNC: 2.9 GM/DL
GLUCOSE SERPL-MCNC: 88 MG/DL (ref 65–99)
HCT VFR BLD AUTO: 40.1 % (ref 34–46.6)
HDLC SERPL-MCNC: 48 MG/DL (ref 40–60)
HGB BLD-MCNC: 13.3 G/DL (ref 12–15.9)
IMM GRANULOCYTES # BLD AUTO: 0.01 10*3/MM3 (ref 0–0.05)
IMM GRANULOCYTES NFR BLD AUTO: 0.1 % (ref 0–0.5)
IRON 24H UR-MRATE: 60 MCG/DL (ref 37–145)
IRON SATN MFR SERPL: 14 % (ref 20–50)
LDLC SERPL CALC-MCNC: 150 MG/DL (ref 0–100)
LDLC/HDLC SERPL: 3.08 {RATIO}
LYMPHOCYTES # BLD AUTO: 1.93 10*3/MM3 (ref 0.7–3.1)
LYMPHOCYTES NFR BLD AUTO: 26.3 % (ref 19.6–45.3)
MCH RBC QN AUTO: 28.4 PG (ref 26.6–33)
MCHC RBC AUTO-ENTMCNC: 33.2 G/DL (ref 31.5–35.7)
MCV RBC AUTO: 85.5 FL (ref 79–97)
MONOCYTES # BLD AUTO: 0.54 10*3/MM3 (ref 0.1–0.9)
MONOCYTES NFR BLD AUTO: 7.3 % (ref 5–12)
NEUTROPHILS NFR BLD AUTO: 4.74 10*3/MM3 (ref 1.7–7)
NEUTROPHILS NFR BLD AUTO: 64.5 % (ref 42.7–76)
NRBC BLD AUTO-RTO: 0 /100 WBC (ref 0–0.2)
PLATELET # BLD AUTO: 279 10*3/MM3 (ref 140–450)
PMV BLD AUTO: 11.6 FL (ref 6–12)
POTASSIUM SERPL-SCNC: 3.7 MMOL/L (ref 3.5–5.2)
PROT SERPL-MCNC: 7.2 G/DL (ref 6–8.5)
RBC # BLD AUTO: 4.69 10*6/MM3 (ref 3.77–5.28)
SODIUM SERPL-SCNC: 137 MMOL/L (ref 136–145)
TIBC SERPL-MCNC: 443 MCG/DL (ref 298–536)
TRANSFERRIN SERPL-MCNC: 297 MG/DL (ref 200–360)
TRIGL SERPL-MCNC: 112 MG/DL (ref 0–150)
TSH SERPL DL<=0.05 MIU/L-ACNC: 1.16 UIU/ML (ref 0.27–4.2)
VLDLC SERPL-MCNC: 20 MG/DL (ref 5–40)
WBC NRBC COR # BLD AUTO: 7.35 10*3/MM3 (ref 3.4–10.8)

## 2024-01-16 PROCEDURE — 80050 GENERAL HEALTH PANEL: CPT | Performed by: NURSE PRACTITIONER

## 2024-01-16 PROCEDURE — 99214 OFFICE O/P EST MOD 30 MIN: CPT | Performed by: NURSE PRACTITIONER

## 2024-01-16 PROCEDURE — 80061 LIPID PANEL: CPT | Performed by: NURSE PRACTITIONER

## 2024-01-16 PROCEDURE — 84466 ASSAY OF TRANSFERRIN: CPT | Performed by: NURSE PRACTITIONER

## 2024-01-16 PROCEDURE — 83540 ASSAY OF IRON: CPT | Performed by: NURSE PRACTITIONER

## 2024-01-16 NOTE — PROGRESS NOTES
"Chief Complaint  Hypertension (6 month follow-up)    Subjective          Lidia Davis presents to [facility]  History of Present Illness    Hypertension  This is a chronic problem. The problem is uncontrolled. Pertinent negatives include no anxiety, blurred vision, chest pain, headaches, malaise/fatigue, neck pain, orthopnea, palpitations, peripheral edema, PND, shortness of breath or sweats. Past treatments include diuretics. The current treatment provides mild improvement. Compliance problems include diet and exercise.  There is no history of angina, kidney disease, CAD/MI, CVA, heart failure, left ventricular hypertrophy, PVD or retinopathy.    Current Outpatient Medications   Medication Instructions    fluticasone (FLONASE) 50 MCG/ACT nasal spray 2 sprays, Nasal, Daily    losartan (COZAAR) 25 mg, Oral, Daily       The following portions of the patient's history were reviewed and updated as appropriate: allergies, current medications, past family history, past medical history, past social history, past surgical history, and problem list.    Objective   Vital Signs:   /85 (BP Location: Right arm, Patient Position: Sitting, Cuff Size: Adult)   Pulse 87   Temp 98.5 °F (36.9 °C) (Temporal)   Ht 157.5 cm (62\")   Wt 82.6 kg (182 lb)   SpO2 98%   BMI 33.29 kg/m²     BP Readings from Last 3 Encounters:   01/16/24 140/85   08/23/23 127/83   08/02/23 124/80     Wt Readings from Last 3 Encounters:   01/16/24 82.6 kg (182 lb)   08/23/23 83.2 kg (183 lb 6.8 oz)   08/02/23 85.4 kg (188 lb 4 oz)         Physical Exam     Appearance: No acute distress, well-nourished  Head: normocephalic, atraumatic  Eyes: extraocular movements intact, no scleral icterus, no conjunctival injection  Ears, Nose, and Throat: external ears normal, nares patent, moist mucous membranes  Cardiovascular: regular rate and rhythm. no murmurs, rubs, or gallops. no edema  Respiratory: breathing " comfortably, symmetric chest rise, clear to auscultation bilaterally. No wheezes, rales, or rhonchi.  Neuro: alert and oriented to time, place, and person. Normal gait  Psych: normal mood and affect     Result Review :   The following data was reviewed by: RD Moya on 01/16/2024:  Common labs          3/16/2023    09:10 1/16/2024    14:56   Common Labs   Glucose 68  88    BUN 11  12    Creatinine 0.92  0.95    Sodium 139  137    Potassium 3.8  3.7    Chloride 102  102    Calcium 9.6  9.1    Albumin 4.5  4.3    Total Bilirubin 0.2  <0.2    Alkaline Phosphatase 76  76    AST (SGOT) 18  14    ALT (SGPT) 13  8    WBC 5.86  7.35    Hemoglobin 13.6  13.3    Hematocrit 41.4  40.1    Platelets 262  279    Total Cholesterol 219  218    Triglycerides 108  112    HDL Cholesterol 56  48    LDL Cholesterol  144  150             Lab Results   Component Value Date    SARSANTIGEN Not Detected 12/05/2022    RAPSCRN Negative 12/05/2022       Procedures        Assessment and Plan    Diagnoses and all orders for this visit:    1. Hypertension, unspecified type (Primary)  Assessment & Plan:  Hypertension is improving with treatment.  Continue current treatment regimen.  Dietary sodium restriction.  Weight loss.  Regular aerobic exercise.  Continue current medications.  Ambulatory blood pressure monitoring.  Blood pressure will be reassessed in 3 months.    Orders:  -     TSH Rfx On Abnormal To Free T4  -     Comprehensive Metabolic Panel  -     CBC & Differential  -     losartan (COZAAR) 25 MG tablet; Take 1 tablet by mouth Daily.  Dispense: 90 tablet; Refill: 1    2. Iron deficiency anemia, unspecified iron deficiency anemia type  -     TSH Rfx On Abnormal To Free T4  -     Comprehensive Metabolic Panel  -     CBC & Differential  -     Iron and TIBC    3. Screening for lipid disorders  -     Lipid Panel          Medications Discontinued During This Encounter   Medication Reason    losartan (COZAAR) 25 MG tablet Reorder           Follow Up   Return in about 6 months (around 7/16/2024) for Hypertension.  Patient was given instructions and counseling regarding her condition or for health maintenance advice. Please see specific information pulled into the AVS if appropriate.       Veronica Acuna, RD  01/18/24  11:38 EST

## 2024-01-18 RX ORDER — LOSARTAN POTASSIUM 25 MG/1
25 TABLET ORAL DAILY
Qty: 90 TABLET | Refills: 1 | Status: SHIPPED | OUTPATIENT
Start: 2024-01-18

## 2024-03-19 ENCOUNTER — OFFICE VISIT (OUTPATIENT)
Dept: INTERNAL MEDICINE | Facility: CLINIC | Age: 44
End: 2024-03-19
Payer: COMMERCIAL

## 2024-03-19 VITALS
HEIGHT: 62 IN | BODY MASS INDEX: 33.49 KG/M2 | DIASTOLIC BLOOD PRESSURE: 94 MMHG | SYSTOLIC BLOOD PRESSURE: 150 MMHG | OXYGEN SATURATION: 97 % | WEIGHT: 182 LBS | HEART RATE: 87 BPM | TEMPERATURE: 98.6 F

## 2024-03-19 DIAGNOSIS — G43.909 MIGRAINE WITHOUT STATUS MIGRAINOSUS, NOT INTRACTABLE, UNSPECIFIED MIGRAINE TYPE: Primary | ICD-10-CM

## 2024-03-19 RX ORDER — IBUPROFEN 200 MG
200 TABLET ORAL EVERY 6 HOURS PRN
COMMUNITY

## 2024-03-19 RX ORDER — KETOROLAC TROMETHAMINE 30 MG/ML
60 INJECTION, SOLUTION INTRAMUSCULAR; INTRAVENOUS ONCE
Status: COMPLETED | OUTPATIENT
Start: 2024-03-19 | End: 2024-03-19

## 2024-03-19 RX ORDER — RIMEGEPANT SULFATE 75 MG/75MG
75 TABLET, ORALLY DISINTEGRATING ORAL ONCE AS NEEDED
Qty: 2 TABLET | Refills: 0 | COMMUNITY
Start: 2024-03-19

## 2024-03-19 RX ADMIN — KETOROLAC TROMETHAMINE 60 MG: 30 INJECTION, SOLUTION INTRAMUSCULAR; INTRAVENOUS at 14:34

## 2024-03-19 NOTE — PROGRESS NOTES
"Chief Complaint  Headache (Started Sunday and has not gone away since, (only on the right side))    Subjective      Lidia Davis is a 43 year old female that presents to [facility] with c/o headache that started 2 days ago. She states that the headache is on the right side of the head. Waking her at night. Associated with photophobia and mild nausea. No nausea today, no vomiting. Pain is located from the ear to the right eye, typically goes from the eye to the back of the head.     She reports a hx of migraines. Previously on a triptan but has not been for several years. First migraine in 4-5 years that she feels is unmanageable. Has taken excedrin twice today, advil yesterday without relief.   On menstrual cycle currently.     Denies any known sick contacts or exposure.       History of Present Illness    Current Outpatient Medications   Medication Instructions    aspirin-acetaminophen-caffeine (EXCEDRIN MIGRAINE) 250-250-65 MG per tablet 1 tablet, Oral, Every 6 Hours PRN    fluticasone (FLONASE) 50 MCG/ACT nasal spray 2 sprays, Nasal, Daily    ibuprofen (ADVIL,MOTRIN) 200 mg, Oral, Every 6 Hours PRN    losartan (COZAAR) 25 mg, Oral, Daily    Nurtec 75 mg, Oral, Once As Needed       The following portions of the patient's history were reviewed and updated as appropriate: allergies, current medications, past family history, past medical history, past social history, past surgical history, and problem list.    Objective   Vital Signs:   /94 (BP Location: Left arm, Patient Position: Sitting)   Pulse 87   Temp 98.6 °F (37 °C) (Temporal)   Ht 157.5 cm (62\")   Wt 82.6 kg (182 lb)   SpO2 97%   BMI 33.29 kg/m²     Wt Readings from Last 3 Encounters:   03/19/24 82.6 kg (182 lb)   01/16/24 82.6 kg (182 lb)   08/23/23 83.2 kg (183 lb 6.8 oz)     BP Readings from Last 3 Encounters:   03/19/24 150/94   01/16/24 140/85   08/23/23 127/83     Physical Exam  Vitals and " nursing note reviewed.   Constitutional:       Appearance: Normal appearance. She is not ill-appearing.   HENT:      Right Ear: Ear canal and external ear normal. Tympanic membrane is scarred.      Left Ear: Ear canal and external ear normal. Tympanic membrane is scarred.   Pulmonary:      Effort: Pulmonary effort is normal.   Neurological:      Mental Status: She is alert and oriented to person, place, and time.   Psychiatric:         Mood and Affect: Mood normal.         Behavior: Behavior normal.          Result Review :  The following data was reviewed by: RD Dietz on 03/19/2024:      Common labs          1/16/2024    14:56   Common Labs   Glucose 88    BUN 12    Creatinine 0.95    Sodium 137    Potassium 3.7    Chloride 102    Calcium 9.1    Albumin 4.3    Total Bilirubin <0.2    Alkaline Phosphatase 76    AST (SGOT) 14    ALT (SGPT) 8    WBC 7.35    Hemoglobin 13.3    Hematocrit 40.1    Platelets 279    Total Cholesterol 218    Triglycerides 112    HDL Cholesterol 48    LDL Cholesterol  150        Lab Results (last 72 hours)       ** No results found for the last 72 hours. **             No Images in the past 120 days found..    Lab Results   Component Value Date    SARSANTIGEN Not Detected 12/05/2022    RAPSCRN Negative 12/05/2022       Procedures        Assessment and Plan   Diagnoses and all orders for this visit:    1. Migraine without status migrainosus, not intractable, unspecified migraine type (Primary)  -     ketorolac (TORADOL) injection 60 mg  -     Rimegepant Sulfate (Nurtec) 75 MG tablet dispersible tablet; Take 1 tablet by mouth 1 (One) Time As Needed (Migraine).  Dispense: 2 tablet; Refill: 0                 There are no discontinued medications.       Follow Up   No follow-ups on file.  Patient was given instructions and counseling regarding her condition or for health maintenance advice. Please see specific information pulled into the AVS if appropriate.     If you develop any  new or worsening symptoms, please go to the ER for further evaluation.    Cha Maurice, APRN  03/19/24  19:47 EDT

## 2024-05-30 DIAGNOSIS — I10 HYPERTENSION, UNSPECIFIED TYPE: ICD-10-CM

## 2024-05-30 RX ORDER — LOSARTAN POTASSIUM 25 MG/1
25 TABLET ORAL DAILY
Qty: 90 TABLET | Refills: 1 | Status: SHIPPED | OUTPATIENT
Start: 2024-05-30

## 2024-06-05 ENCOUNTER — CLINICAL SUPPORT (OUTPATIENT)
Dept: INTERNAL MEDICINE | Facility: CLINIC | Age: 44
End: 2024-06-05
Payer: COMMERCIAL

## 2024-06-05 DIAGNOSIS — Z11.1 VISIT FOR TB SKIN TEST: Primary | ICD-10-CM

## 2024-06-05 PROCEDURE — 86580 TB INTRADERMAL TEST: CPT | Performed by: NURSE PRACTITIONER

## 2024-06-05 NOTE — PROGRESS NOTES
PPD Placement note  Lidia Davis, 43 y.o. female is here today for placement of PPD test  Reason for PPD test: work   Pt taken PPD test before: yes  Verified in allergy area and with patient that they are not allergic to the products PPD is made of (Phenol or Tween). No  Is patient taking any oral or IV steroid medication now or have they taken it in the last month? no  Has the patient ever received the BCG vaccine?: no  Has the patient been in recent contact with anyone known or suspected of having active TB disease?: no       Date of exposure (if applicable): n/a       Name of person they were exposed to (if applicable): n/a  Patient's Country of origin?: n/a  O: Alert and oriented in NAD.  P:  PPD placed on 6/5/2024.  Patient advised to return for reading within 48-72 hours.

## 2024-06-07 ENCOUNTER — CLINICAL SUPPORT (OUTPATIENT)
Dept: INTERNAL MEDICINE | Facility: CLINIC | Age: 44
End: 2024-06-07
Payer: COMMERCIAL

## 2024-06-07 DIAGNOSIS — Z11.1 VISIT FOR TB SKIN TEST: Primary | ICD-10-CM

## 2024-06-07 NOTE — PROGRESS NOTES
PPD Reading Note  PPD read and results entered in [facility].  Result: 0 mm induration.  Interpretation: 0  If test not read within 48-72 hours of initial placement, patient advised to repeat in other arm 1-3 weeks after this test.  Allergic reaction: no

## 2024-06-29 DIAGNOSIS — I10 HYPERTENSION, UNSPECIFIED TYPE: ICD-10-CM

## 2024-07-01 RX ORDER — LOSARTAN POTASSIUM 25 MG/1
25 TABLET ORAL DAILY
Qty: 90 TABLET | Refills: 1 | Status: SHIPPED | OUTPATIENT
Start: 2024-07-01

## 2024-07-03 RX ORDER — PREDNISONE 50 MG/1
50 TABLET ORAL DAILY
Qty: 5 TABLET | Refills: 0 | Status: SHIPPED | OUTPATIENT
Start: 2024-07-03 | End: 2024-07-08

## 2024-07-03 RX ORDER — CLINDAMYCIN HYDROCHLORIDE 300 MG/1
300 CAPSULE ORAL 2 TIMES DAILY
Qty: 14 CAPSULE | Refills: 0 | Status: SHIPPED | OUTPATIENT
Start: 2024-07-03 | End: 2024-07-10

## 2024-08-08 ENCOUNTER — OFFICE VISIT (OUTPATIENT)
Dept: INTERNAL MEDICINE | Facility: CLINIC | Age: 44
End: 2024-08-08
Payer: COMMERCIAL

## 2024-08-08 VITALS
SYSTOLIC BLOOD PRESSURE: 141 MMHG | HEART RATE: 91 BPM | WEIGHT: 174.6 LBS | OXYGEN SATURATION: 96 % | HEIGHT: 62 IN | TEMPERATURE: 97.7 F | DIASTOLIC BLOOD PRESSURE: 94 MMHG | BODY MASS INDEX: 32.13 KG/M2

## 2024-08-08 DIAGNOSIS — U07.1 COVID-19 VIRUS INFECTION: Primary | ICD-10-CM

## 2024-08-08 LAB
EXPIRATION DATE: ABNORMAL
EXPIRATION DATE: NORMAL
FLUAV AG UPPER RESP QL IA.RAPID: NOT DETECTED
FLUBV AG UPPER RESP QL IA.RAPID: NOT DETECTED
INTERNAL CONTROL: ABNORMAL
INTERNAL CONTROL: NORMAL
Lab: ABNORMAL
Lab: NORMAL
S PYO AG THROAT QL: NEGATIVE
SARS-COV-2 AG UPPER RESP QL IA.RAPID: DETECTED

## 2024-08-08 PROCEDURE — 87081 CULTURE SCREEN ONLY: CPT | Performed by: STUDENT IN AN ORGANIZED HEALTH CARE EDUCATION/TRAINING PROGRAM

## 2024-08-08 PROCEDURE — 87428 SARSCOV & INF VIR A&B AG IA: CPT | Performed by: STUDENT IN AN ORGANIZED HEALTH CARE EDUCATION/TRAINING PROGRAM

## 2024-08-08 PROCEDURE — 99213 OFFICE O/P EST LOW 20 MIN: CPT | Performed by: STUDENT IN AN ORGANIZED HEALTH CARE EDUCATION/TRAINING PROGRAM

## 2024-08-08 PROCEDURE — 87880 STREP A ASSAY W/OPTIC: CPT | Performed by: STUDENT IN AN ORGANIZED HEALTH CARE EDUCATION/TRAINING PROGRAM

## 2024-08-08 NOTE — PROGRESS NOTES
"Chief Complaint  Positive Covid test     Subjective          Lidia Davis presents to [facility]  History of Present Illness    Here with 2 days of sick symptoms (started 8/6/24).  Symptoms include sore throat, subjective fever, mild cough/congestion, body aches.  When measured temperature thus far, temp has not been in febrile range.  Having right sided otalgia.  No vomiting or diarrhea.  Tolerating PO.  Had positive COVID test at home yesterday.  Teaches in .  Reports smart watch alerted her that her HR was over 120 on two occasions.  No chest pain.      Current Outpatient Medications   Medication Instructions    aspirin-acetaminophen-caffeine (EXCEDRIN MIGRAINE) 250-250-65 MG per tablet 1 tablet, Oral, Every 6 Hours PRN    fluticasone (FLONASE) 50 MCG/ACT nasal spray 2 sprays, Nasal, Daily    ibuprofen (ADVIL,MOTRIN) 200 mg, Every 6 Hours PRN    losartan (COZAAR) 25 mg, Oral, Daily    Nurtec 75 mg, Oral, Once As Needed    phenol (CHLORASEPTIC) 1.4 % liquid liquid 1 spray, Mouth/Throat, Every 2 Hours PRN       The following portions of the patient's history were reviewed and updated as appropriate: allergies, current medications, past family history, past medical history, past social history, past surgical history, and problem list.    Objective   Vital Signs:   /94   Pulse 91   Temp 97.7 °F (36.5 °C) (Temporal)   Ht 157.5 cm (62.01\")   Wt 79.2 kg (174 lb 9.6 oz)   SpO2 96%   BMI 31.93 kg/m²     BP Readings from Last 3 Encounters:   08/08/24 141/94   03/19/24 150/94   01/16/24 140/85     Wt Readings from Last 3 Encounters:   08/08/24 79.2 kg (174 lb 9.6 oz)   03/19/24 82.6 kg (182 lb)   01/16/24 82.6 kg (182 lb)        Physical Exam  Vitals reviewed.   Constitutional:       General: She is not in acute distress.     Appearance: Normal appearance. She is not ill-appearing, toxic-appearing or diaphoretic.   HENT:      Head: Normocephalic and " atraumatic.      Right Ear: Tympanic membrane, ear canal and external ear normal.      Left Ear: Tympanic membrane, ear canal and external ear normal.   Eyes:      Conjunctiva/sclera: Conjunctivae normal.   Cardiovascular:      Rate and Rhythm: Normal rate and regular rhythm.      Pulses: Normal pulses.      Heart sounds: Normal heart sounds. No murmur heard.     No friction rub. No gallop.   Pulmonary:      Effort: Pulmonary effort is normal. No respiratory distress.      Breath sounds: Normal breath sounds. No stridor. No wheezing, rhonchi or rales.   Chest:      Chest wall: No tenderness.   Abdominal:      General: Abdomen is flat.      Palpations: Abdomen is soft. There is no mass.      Tenderness: There is no abdominal tenderness.   Musculoskeletal:      Right lower leg: No edema.      Left lower leg: No edema.   Skin:     General: Skin is warm and dry.   Neurological:      General: No focal deficit present.      Mental Status: She is alert. Mental status is at baseline.   Psychiatric:         Mood and Affect: Mood normal.         Behavior: Behavior normal.         Thought Content: Thought content normal.         Judgment: Judgment normal.          Result Review :   The following data was reviewed by: Shiraz Castillo MD on 08/08/2024:  Common labs          1/16/2024    14:56   Common Labs   Glucose 88    BUN 12    Creatinine 0.95    Sodium 137    Potassium 3.7    Chloride 102    Calcium 9.1    Albumin 4.3    Total Bilirubin <0.2    Alkaline Phosphatase 76    AST (SGOT) 14    ALT (SGPT) 8    WBC 7.35    Hemoglobin 13.3    Hematocrit 40.1    Platelets 279    Total Cholesterol 218    Triglycerides 112    HDL Cholesterol 48    LDL Cholesterol  150             Lab Results   Component Value Date    SARSANTIGEN Detected (A) 08/08/2024    FLUAAG Not Detected 08/08/2024    FLUBAG Not Detected 08/08/2024    RAPSCRN Negative 08/08/2024            Assessment and Plan    Diagnoses and all orders for this visit:    1.  COVID-19 virus infection (Primary)  -     POCT rapid strep A  -     POCT SARS-CoV-2 Antigen RIA + Flu  -     Beta Strep Culture, Throat - Swab, Throat    Other orders  -     phenol (CHLORASEPTIC) 1.4 % liquid liquid; Apply 1 spray to the mouth or throat Every 2 (Two) Hours As Needed (sore throat).  Dispense: 177 mL; Refill: 0      -symptomatic management: NSAIDs prn fever and aches/pains, nasal saline for congestion, gargling with salt water for sore throat, PRN phenol spray  -per current CDC guidance, if not running a fever and symptoms are mild she is okay to go to work.  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       Follow Up   Return if symptoms worsen or fail to improve.  Patient was given instructions and counseling regarding her condition or for health maintenance advice. Please see specific information pulled into the AVS if appropriate.       Shiraz Castillo MD  08/08/24  13:51 EDT

## 2024-08-08 NOTE — LETTER
August 8, 2024     Patient: Lidia Pack   YOB: 1980   Date of Visit: 8/8/2024       To Whom It May Concern:    It is my medical opinion that Lidia Pack  should remain out of work until 24 hours fever free due to COVID-19 infection .      Please do not hesitate to contact my office with any questions or concerns.      Sincerely,        Shiraz Castillo MD

## 2024-08-10 LAB — BACTERIA SPEC AEROBE CULT: NORMAL

## 2024-09-04 ENCOUNTER — OFFICE VISIT (OUTPATIENT)
Dept: INTERNAL MEDICINE | Facility: CLINIC | Age: 44
End: 2024-09-04
Payer: COMMERCIAL

## 2024-09-04 VITALS
TEMPERATURE: 98.4 F | HEART RATE: 78 BPM | BODY MASS INDEX: 32.76 KG/M2 | HEIGHT: 62 IN | OXYGEN SATURATION: 98 % | WEIGHT: 178 LBS | DIASTOLIC BLOOD PRESSURE: 93 MMHG | SYSTOLIC BLOOD PRESSURE: 145 MMHG

## 2024-09-04 DIAGNOSIS — I10 HYPERTENSION, UNSPECIFIED TYPE: Chronic | ICD-10-CM

## 2024-09-04 DIAGNOSIS — Z12.31 ENCOUNTER FOR SCREENING MAMMOGRAM FOR MALIGNANT NEOPLASM OF BREAST: ICD-10-CM

## 2024-09-04 DIAGNOSIS — L30.9 DERMATITIS: Primary | ICD-10-CM

## 2024-09-04 PROCEDURE — 99213 OFFICE O/P EST LOW 20 MIN: CPT | Performed by: NURSE PRACTITIONER

## 2024-09-04 PROCEDURE — 96372 THER/PROPH/DIAG INJ SC/IM: CPT | Performed by: NURSE PRACTITIONER

## 2024-09-04 RX ORDER — METHYLPREDNISOLONE SODIUM SUCCINATE 125 MG/2ML
125 INJECTION, POWDER, LYOPHILIZED, FOR SOLUTION INTRAMUSCULAR; INTRAVENOUS ONCE
Status: COMPLETED | OUTPATIENT
Start: 2024-09-04 | End: 2024-09-04

## 2024-09-04 RX ORDER — PERMETHRIN 50 MG/G
1 CREAM TOPICAL ONCE
Qty: 60 G | Refills: 0 | Status: SHIPPED | OUTPATIENT
Start: 2024-09-04 | End: 2024-09-04

## 2024-09-04 RX ORDER — LOSARTAN POTASSIUM 50 MG/1
50 TABLET ORAL DAILY
Qty: 30 TABLET | Refills: 1 | Status: SHIPPED | OUTPATIENT
Start: 2024-09-04

## 2024-09-04 RX ORDER — TRIAMCINOLONE ACETONIDE 1 MG/G
1 OINTMENT TOPICAL 2 TIMES DAILY
Qty: 453.6 G | Refills: 0 | Status: SHIPPED | OUTPATIENT
Start: 2024-09-04

## 2024-09-04 RX ORDER — METHYLPREDNISOLONE SODIUM SUCCINATE 125 MG/2ML
125 INJECTION, POWDER, LYOPHILIZED, FOR SOLUTION INTRAMUSCULAR; INTRAVENOUS EVERY 6 HOURS
Status: SHIPPED | OUTPATIENT
Start: 2024-09-04

## 2024-09-04 RX ADMIN — METHYLPREDNISOLONE SODIUM SUCCINATE 125 MG: 125 INJECTION, POWDER, LYOPHILIZED, FOR SOLUTION INTRAMUSCULAR; INTRAVENOUS at 14:48

## 2024-09-04 NOTE — PROGRESS NOTES
"Chief Complaint  Rash (X3 weeks all over. Itchy, worse at night)    Subjective          Lidia Davis presents to [facility]  Rash  This is a new problem. Episode onset: 3 weeks. Pertinent negatives include no anorexia, congestion, cough, diarrhea, eye pain, facial edema, fatigue, fever, joint pain, nail changes, rhinorrhea, shortness of breath, sore throat or vomiting.       Hypertension  This is a chronic problem. The problem is uncontrolled. Pertinent negatives include no anxiety, blurred vision, chest pain, headaches, malaise/fatigue, neck pain, orthopnea, palpitations, peripheral edema, PND, shortness of breath or sweats. Past treatments include diuretics. The current treatment provides mild improvement. Compliance problems include diet and exercise.  There is no history of angina, kidney disease, CAD/MI, CVA, heart failure, left ventricular hypertrophy, PVD or retinopathy.      Current Outpatient Medications   Medication Instructions    fluticasone (FLONASE) 50 MCG/ACT nasal spray 2 sprays, Nasal, Daily    losartan (COZAAR) 50 mg, Oral, Daily    Nurtec 75 mg, Oral, Once As Needed    triamcinolone (KENALOG) 0.1 % ointment 1 Application, Topical, 2 Times Daily       The following portions of the patient's history were reviewed and updated as appropriate: allergies, current medications, past family history, past medical history, past social history, past surgical history, and problem list.    Objective   Vital Signs:   /93   Pulse 78   Temp 98.4 °F (36.9 °C)   Ht 157.5 cm (62\")   Wt 80.7 kg (178 lb)   SpO2 98%   BMI 32.56 kg/m²     Wt Readings from Last 3 Encounters:   09/04/24 80.7 kg (178 lb)   08/08/24 79.2 kg (174 lb 9.6 oz)   03/19/24 82.6 kg (182 lb)     BP Readings from Last 3 Encounters:   09/04/24 145/93   08/08/24 141/94   03/19/24 150/94     Physical Exam  Skin:     Findings: Rash present.        Appearance: No acute distress, " well-nourished  Head: normocephalic, atraumatic  Eyes: extraocular movements intact, no scleral icterus, no conjunctival injection  Ears, Nose, and Throat: external ears normal, nares patent, moist mucous membranes, tympanic membranes clear bilaterally. Tonsils within normal limits. Oropharynx clear.   Cardiovascular: regular rate and rhythm. no murmurs, rubs, or gallops. no edema  Respiratory: breathing comfortably, symmetric chest rise, clear to auscultation bilaterally. No wheezes, rales, or rhonchi.  Neuro: alert and moves all extremities equally  Lymph: no occipital, cervical, submandibular,or supraclavicular lymphadenopathy.      Result Review :   The following data was reviewed by: RD Moya on 09/04/2024:  Common labs          1/16/2024    14:56   Common Labs   Glucose 88    BUN 12    Creatinine 0.95    Sodium 137    Potassium 3.7    Chloride 102    Calcium 9.1    Albumin 4.3    Total Bilirubin <0.2    Alkaline Phosphatase 76    AST (SGOT) 14    ALT (SGPT) 8    WBC 7.35    Hemoglobin 13.3    Hematocrit 40.1    Platelets 279    Total Cholesterol 218    Triglycerides 112    HDL Cholesterol 48    LDL Cholesterol  150             Lab Results   Component Value Date    SARSANTIGEN Detected (A) 08/08/2024    FLUAAG Not Detected 08/08/2024    FLUBAG Not Detected 08/08/2024    RAPSCRN Negative 08/08/2024          Assessment and Plan    Diagnoses and all orders for this visit:    1. Dermatitis (Primary)  -     permethrin (ELIMITE) 5 % cream; Apply 1 Application topically to the appropriate area as directed 1 (One) Time for 1 dose.  Dispense: 60 g; Refill: 0  -     methylPREDNISolone sodium succinate (SOLU-Medrol) injection 125 mg  -     triamcinolone (KENALOG) 0.1 % ointment; Apply 1 Application topically to the appropriate area as directed 2 (Two) Times a Day.  Dispense: 453.6 g; Refill: 0  -     methylPREDNISolone sodium succinate (SOLU-Medrol) injection 125 mg    2. Encounter for screening mammogram  for malignant neoplasm of breast  -     Mammo Screening Digital Tomosynthesis Bilateral With CAD; Future    3. Hypertension, unspecified type  Comments:  inc losartan today; dash diet; exercise  Orders:  -     losartan (COZAAR) 50 MG tablet; Take 1 tablet by mouth Daily.  Dispense: 30 tablet; Refill: 1          Medications Discontinued During This Encounter   Medication Reason    phenol (CHLORASEPTIC) 1.4 % liquid liquid     ibuprofen (ADVIL,MOTRIN) 200 MG tablet     aspirin-acetaminophen-caffeine (EXCEDRIN MIGRAINE) 250-250-65 MG per tablet     losartan (COZAAR) 25 MG tablet           Follow Up   Return in about 4 weeks (around 10/2/2024) for Hypertension, Annual physical.  Patient was given instructions and counseling regarding her condition or for health maintenance advice. Please see specific information pulled into the AVS if appropriate.       Veronica Acuna, RD  09/11/24  09:49 EDT

## 2024-09-18 ENCOUNTER — FLU SHOT (OUTPATIENT)
Dept: INTERNAL MEDICINE | Facility: CLINIC | Age: 44
End: 2024-09-18
Payer: COMMERCIAL

## 2024-09-18 DIAGNOSIS — Z23 NEED FOR INFLUENZA VACCINATION: Primary | ICD-10-CM

## 2024-09-18 PROCEDURE — 90656 IIV3 VACC NO PRSV 0.5 ML IM: CPT | Performed by: NURSE PRACTITIONER

## 2024-09-18 PROCEDURE — 90471 IMMUNIZATION ADMIN: CPT | Performed by: NURSE PRACTITIONER

## 2024-09-27 ENCOUNTER — HOSPITAL ENCOUNTER (OUTPATIENT)
Dept: MAMMOGRAPHY | Facility: HOSPITAL | Age: 44
Discharge: HOME | End: 2024-09-27
Admitting: NURSE PRACTITIONER
Payer: COMMERCIAL

## 2024-09-27 DIAGNOSIS — Z12.31 ENCOUNTER FOR SCREENING MAMMOGRAM FOR MALIGNANT NEOPLASM OF BREAST: ICD-10-CM

## 2024-09-27 PROCEDURE — 77067 SCR MAMMO BI INCL CAD: CPT

## 2024-09-27 PROCEDURE — 77063 BREAST TOMOSYNTHESIS BI: CPT

## 2024-10-07 NOTE — PROGRESS NOTES
"Chief Complaint  Hypertension and Menstrual Problem (Pt states she is having more discomfort during her period.)    Subjective          Lidia Davis presents to [facility]  History of Present Illness    Pt reports that she has had new onset dysmenorrhea and heavier flow to her menstrual cycle. She reports that she is going through a pad and a tampon in less than 2 hours with clots.   Reports significant pelvic pain during the first few days of her period.     Hypertension  This is a chronic problem. The problem is uncontrolled. Pertinent negatives include no anxiety, blurred vision, chest pain, headaches, malaise/fatigue, neck pain, orthopnea, palpitations, peripheral edema, PND, shortness of breath or sweats. Past treatments include diuretics. The current treatment provides mild improvement. Compliance problems include diet and exercise.  There is no history of angina, kidney disease, CAD/MI, CVA, heart failure, left ventricular hypertrophy, PVD or retinopathy.      Has not heard from her mammogram results. Not back in EPIC from 9/27     Current Outpatient Medications   Medication Instructions    fluticasone (FLONASE) 50 MCG/ACT nasal spray 2 sprays, Nasal, Daily    hydroCHLOROthiazide 12.5 mg, Oral, Daily    losartan (COZAAR) 50 mg, Oral, Daily    Nurtec 75 mg, Oral, Once As Needed    triamcinolone (KENALOG) 0.1 % ointment 1 Application, Topical, 2 Times Daily       The following portions of the patient's history were reviewed and updated as appropriate: allergies, current medications, past family history, past medical history, past social history, past surgical history, and problem list.    Objective   Vital Signs:   /90   Pulse 79   Temp 97.7 °F (36.5 °C)   Ht 157.5 cm (62\")   Wt 78.9 kg (174 lb)   SpO2 98%   BMI 31.83 kg/m²     BP Readings from Last 3 Encounters:   10/08/24 132/90   09/04/24 145/93   08/08/24 141/94     Wt Readings from Last 3 " Encounters:   10/08/24 78.9 kg (174 lb)   09/04/24 80.7 kg (178 lb)   08/08/24 79.2 kg (174 lb 9.6 oz)     BMI is >= 30 and <35. (Class 1 Obesity). The following options were offered after discussion;: weight loss educational material (shared in after visit summary), exercise counseling/recommendations, and nutrition counseling/recommendations     Physical Exam  Constitutional:       Appearance: She is obese.          Appearance: No acute distress, well-nourished  Head: normocephalic, atraumatic  Eyes: extraocular movements intact, no scleral icterus, no conjunctival injection  Ears, Nose, and Throat: external ears normal, nares patent, moist mucous membranes  Cardiovascular: regular rate and rhythm. no murmurs, rubs, or gallops. no edema  Respiratory: breathing comfortably, symmetric chest rise, clear to auscultation bilaterally. No wheezes, rales, or rhonchi.  Neuro: alert and oriented to time, place, and person. Normal gait  Psych: normal mood and affect     Result Review :   The following data was reviewed by: RD Moya on 10/08/2024:  Common labs          1/16/2024    14:56   Common Labs   Glucose 88    BUN 12    Creatinine 0.95    Sodium 137    Potassium 3.7    Chloride 102    Calcium 9.1    Albumin 4.3    Total Bilirubin <0.2    Alkaline Phosphatase 76    AST (SGOT) 14    ALT (SGPT) 8    WBC 7.35    Hemoglobin 13.3    Hematocrit 40.1    Platelets 279    Total Cholesterol 218    Triglycerides 112    HDL Cholesterol 48    LDL Cholesterol  150             Lab Results   Component Value Date    SARSANTIGEN Detected (A) 08/08/2024    FLUAAG Not Detected 08/08/2024    FLUBAG Not Detected 08/08/2024    RAPSCRN Negative 08/08/2024            Assessment and Plan    Diagnoses and all orders for this visit:    1. Hypertension, unspecified type (Primary)  -     hydroCHLOROthiazide 12.5 MG tablet; Take 1 tablet by mouth Daily.  Dispense: 30 tablet; Refill: 1    2. Dysmenorrhea  -     US Non-ob Transvaginal;  Future  -     Ambulatory Referral to Obstetrics / Gynecology    3. Menorrhagia with regular cycle  -     US Non-ob Transvaginal; Future  -     Ambulatory Referral to Obstetrics / Gynecology    4. Class 1 obesity with serious comorbidity and body mass index (BMI) of 31.0 to 31.9 in adult, unspecified obesity type  Assessment & Plan:  Patient's (Body mass index is 31.83 kg/m².) indicates that they are obese (BMI >30) with health conditions that include hypertension . Weight is unchanged. BMI  is above average; BMI management plan is completed. We discussed low calorie, low carb based diet program, portion control, and increasing exercise.         - called radiology who was pulling up her mammogram to read. I will call her with report when it is back     - adding 12.5 mg HCTZ to regimen for slightly tighter control.   Checked BP yesterday and was also still elevated.     - GYN referral along with transvaginal US given new onset dysmenorrhea and heavier cycle     There are no discontinued medications.       Follow Up   Return in about 4 weeks (around 11/5/2024) for Hypertension.  Patient was given instructions and counseling regarding her condition or for health maintenance advice. Please see specific information pulled into the AVS if appropriate.       Veronica Acuna, RD  10/08/24  13:30 EDT

## 2024-10-08 ENCOUNTER — OFFICE VISIT (OUTPATIENT)
Dept: INTERNAL MEDICINE | Facility: CLINIC | Age: 44
End: 2024-10-08
Payer: COMMERCIAL

## 2024-10-08 ENCOUNTER — TELEPHONE (OUTPATIENT)
Dept: INTERNAL MEDICINE | Facility: CLINIC | Age: 44
End: 2024-10-08

## 2024-10-08 VITALS
WEIGHT: 174 LBS | HEIGHT: 62 IN | TEMPERATURE: 97.7 F | DIASTOLIC BLOOD PRESSURE: 90 MMHG | OXYGEN SATURATION: 98 % | BODY MASS INDEX: 32.02 KG/M2 | HEART RATE: 79 BPM | SYSTOLIC BLOOD PRESSURE: 132 MMHG

## 2024-10-08 DIAGNOSIS — I10 HYPERTENSION, UNSPECIFIED TYPE: Primary | ICD-10-CM

## 2024-10-08 DIAGNOSIS — E66.811 CLASS 1 OBESITY WITH SERIOUS COMORBIDITY AND BODY MASS INDEX (BMI) OF 31.0 TO 31.9 IN ADULT, UNSPECIFIED OBESITY TYPE: ICD-10-CM

## 2024-10-08 DIAGNOSIS — N94.6 DYSMENORRHEA: ICD-10-CM

## 2024-10-08 DIAGNOSIS — N92.0 MENORRHAGIA WITH REGULAR CYCLE: ICD-10-CM

## 2024-10-08 PROCEDURE — 99214 OFFICE O/P EST MOD 30 MIN: CPT | Performed by: NURSE PRACTITIONER

## 2024-10-08 RX ORDER — HYDROCHLOROTHIAZIDE 12.5 MG/1
12.5 TABLET ORAL DAILY
Qty: 30 TABLET | Refills: 1 | Status: SHIPPED | OUTPATIENT
Start: 2024-10-08

## 2024-10-08 NOTE — TELEPHONE ENCOUNTER
----- Message from Veronica Acuna sent at 10/8/2024  1:31 PM EDT -----  Mammogram reassuring.     Repeat in 1 year.     Alfreda, please pend.

## 2024-10-08 NOTE — ASSESSMENT & PLAN NOTE
Patient's (Body mass index is 31.83 kg/m².) indicates that they are obese (BMI >30) with health conditions that include hypertension . Weight is unchanged. BMI  is above average; BMI management plan is completed. We discussed low calorie, low carb based diet program, portion control, and increasing exercise.

## 2024-10-12 DIAGNOSIS — I10 HYPERTENSION, UNSPECIFIED TYPE: Chronic | ICD-10-CM

## 2024-10-14 RX ORDER — LOSARTAN POTASSIUM 50 MG/1
50 TABLET ORAL DAILY
Qty: 90 TABLET | Refills: 1 | Status: SHIPPED | OUTPATIENT
Start: 2024-10-14

## 2024-10-24 ENCOUNTER — HOSPITAL ENCOUNTER (OUTPATIENT)
Dept: ULTRASOUND IMAGING | Facility: HOSPITAL | Age: 44
Discharge: HOME | End: 2024-10-24
Admitting: NURSE PRACTITIONER
Payer: COMMERCIAL

## 2024-10-24 DIAGNOSIS — N92.0 MENORRHAGIA WITH REGULAR CYCLE: ICD-10-CM

## 2024-10-24 DIAGNOSIS — N94.6 DYSMENORRHEA: ICD-10-CM

## 2024-10-24 PROCEDURE — 76830 TRANSVAGINAL US NON-OB: CPT

## 2024-10-29 ENCOUNTER — TELEPHONE (OUTPATIENT)
Dept: INTERNAL MEDICINE | Facility: CLINIC | Age: 44
End: 2024-10-29
Payer: COMMERCIAL

## 2024-10-29 NOTE — TELEPHONE ENCOUNTER
Spoke with patient. Verified .    Made aware of results.  Patient reports we placed a referral for GYN, but she is not scheduled until January.   Patient is wondering if she should go ahead and start birth control to help?

## 2024-10-29 NOTE — TELEPHONE ENCOUNTER
----- Message from Veronica Acuna sent at 10/29/2024  1:40 PM EDT -----  Uterine fibroids noted.  does she already have an established GYN or does she need a referral?

## 2024-10-30 NOTE — TELEPHONE ENCOUNTER
"Attempted to contact patient. Left message to call the office back    Relay   Hub ok to read/advise  \"Per Veronica - Patient should wait to start any birth control. If not already on cancellation list for GYN office she should contact them to be added to the cancellation list.\"      "

## 2024-10-30 NOTE — TELEPHONE ENCOUNTER
Name: Lidia Davis    Relationship: Self    Best Callback Number: [phone]     HUB PROVIDED THE RELAY MESSAGE FROM THE OFFICE   PATIENT VOICED UNDERSTANDING AND HAS NO FURTHER QUESTIONS AT THIS TIME

## 2024-11-11 NOTE — PROGRESS NOTES
"Chief Complaint  Hypertension (4 week follow-up.)    Subjective          Lidia Davis presents to [facility]  History of Present Illness    History of Present Illness      Ran out of HCTZ 5-6 days ago.       Hypertension  This is a chronic problem. The problem is uncontrolled. Pertinent negatives include no anxiety, blurred vision, chest pain, headaches, malaise/fatigue, neck pain, orthopnea, palpitations, peripheral edema, PND, shortness of breath or sweats. Past treatments include diuretics. The current treatment provides mild improvement. Compliance problems include diet and exercise.  There is no history of angina, kidney disease, CAD/MI, CVA, heart failure, left ventricular hypertrophy, PVD or retinopathy.      Current Outpatient Medications   Medication Instructions    fluticasone (FLONASE) 50 MCG/ACT nasal spray 2 sprays, Nasal, Daily    losartan-hydrochlorothiazide (Hyzaar) 50-12.5 MG per tablet 1 tablet, Oral, Daily    Nurtec 75 mg, Oral, Once As Needed    triamcinolone (KENALOG) 0.1 % ointment 1 Application, Topical, 2 Times Daily       The following portions of the patient's history were reviewed and updated as appropriate: allergies, current medications, past family history, past medical history, past social history, past surgical history, and problem list.    Objective   Vital Signs:   /83 (BP Location: Left arm, Patient Position: Sitting, Cuff Size: Adult)   Pulse 80   Temp 97.5 °F (36.4 °C) (Temporal)   Ht 157.5 cm (62\")   Wt 79.8 kg (176 lb)   SpO2 98%   BMI 32.19 kg/m²     BP Readings from Last 3 Encounters:   11/12/24 139/83   10/08/24 132/90   09/04/24 145/93     Wt Readings from Last 3 Encounters:   11/12/24 79.8 kg (176 lb)   10/08/24 78.9 kg (174 lb)   09/04/24 80.7 kg (178 lb)           Physical Exam     Appearance: No acute distress, well-nourished  Head: normocephalic, atraumatic  Eyes: extraocular movements intact, no scleral " icterus, no conjunctival injection  Ears, Nose, and Throat: external ears normal, nares patent, moist mucous membranes  Cardiovascular: regular rate and rhythm. no murmurs, rubs, or gallops. no edema  Respiratory: breathing comfortably, symmetric chest rise, clear to auscultation bilaterally. No wheezes, rales, or rhonchi.  Neuro: alert and oriented to time, place, and person. Normal gait  Psych: normal mood and affect     Physical Exam        Result Review :   The following data was reviewed by: RD Moya on 11/12/2024:  Common labs          1/16/2024    14:56   Common Labs   Glucose 88    BUN 12    Creatinine 0.95    Sodium 137    Potassium 3.7    Chloride 102    Calcium 9.1    Albumin 4.3    Total Bilirubin <0.2    Alkaline Phosphatase 76    AST (SGOT) 14    ALT (SGPT) 8    WBC 7.35    Hemoglobin 13.3    Hematocrit 40.1    Platelets 279    Total Cholesterol 218    Triglycerides 112    HDL Cholesterol 48    LDL Cholesterol  150        Results           Lab Results   Component Value Date    SARSANTIGEN Detected (A) 08/08/2024    FLUAAG Not Detected 08/08/2024    FLUBAG Not Detected 08/08/2024    RAPSCRN Negative 08/08/2024            Assessment and Plan    Diagnoses and all orders for this visit:    1. Hypertension, unspecified type (Primary)  -     losartan-hydrochlorothiazide (Hyzaar) 50-12.5 MG per tablet; Take 1 tablet by mouth Daily.  Dispense: 90 tablet; Refill: 1      - will switch to combo pill today   Assessment & Plan      Medications Discontinued During This Encounter   Medication Reason    losartan (COZAAR) 50 MG tablet     hydroCHLOROthiazide 12.5 MG tablet           Follow Up   Return in about 3 months (around 2/12/2025) for Annual physical, Hypertension, Hyperlipidemia.  Patient was given instructions and counseling regarding her condition or for health maintenance advice. Please see specific information pulled into the AVS if appropriate.       Veronica Acuna  RD  11/15/24  13:43 EST      Patient or patient representative verbalized consent for the use of Ambient Listening during the visit with  RD Moya for chart documentation. 11/15/2024  13:43 EST

## 2024-11-12 ENCOUNTER — OFFICE VISIT (OUTPATIENT)
Dept: INTERNAL MEDICINE | Facility: CLINIC | Age: 44
End: 2024-11-12
Payer: COMMERCIAL

## 2024-11-12 VITALS
HEART RATE: 80 BPM | WEIGHT: 176 LBS | TEMPERATURE: 97.5 F | OXYGEN SATURATION: 98 % | BODY MASS INDEX: 32.39 KG/M2 | HEIGHT: 62 IN | SYSTOLIC BLOOD PRESSURE: 139 MMHG | DIASTOLIC BLOOD PRESSURE: 83 MMHG

## 2024-11-12 DIAGNOSIS — I10 HYPERTENSION, UNSPECIFIED TYPE: Primary | ICD-10-CM

## 2024-11-12 PROCEDURE — 99213 OFFICE O/P EST LOW 20 MIN: CPT | Performed by: NURSE PRACTITIONER

## 2024-11-12 RX ORDER — LOSARTAN POTASSIUM AND HYDROCHLOROTHIAZIDE 12.5; 5 MG/1; MG/1
1 TABLET ORAL DAILY
Qty: 90 TABLET | Refills: 1 | Status: SHIPPED | OUTPATIENT
Start: 2024-11-12

## 2024-12-13 DIAGNOSIS — I10 HYPERTENSION, UNSPECIFIED TYPE: ICD-10-CM

## 2024-12-13 RX ORDER — HYDROCHLOROTHIAZIDE 12.5 MG/1
12.5 TABLET ORAL DAILY
Qty: 90 TABLET | OUTPATIENT
Start: 2024-12-13

## 2025-01-16 ENCOUNTER — OFFICE VISIT (OUTPATIENT)
Dept: OBSTETRICS AND GYNECOLOGY | Facility: CLINIC | Age: 45
End: 2025-01-16
Payer: COMMERCIAL

## 2025-01-16 VITALS
HEIGHT: 62 IN | BODY MASS INDEX: 32.76 KG/M2 | DIASTOLIC BLOOD PRESSURE: 94 MMHG | WEIGHT: 178 LBS | SYSTOLIC BLOOD PRESSURE: 128 MMHG

## 2025-01-16 DIAGNOSIS — Z01.419 WELL WOMAN EXAM: Primary | ICD-10-CM

## 2025-01-16 PROCEDURE — G0123 SCREEN CERV/VAG THIN LAYER: HCPCS | Performed by: OBSTETRICS & GYNECOLOGY

## 2025-01-16 PROCEDURE — 87624 HPV HI-RISK TYP POOLED RSLT: CPT | Performed by: OBSTETRICS & GYNECOLOGY

## 2025-01-16 NOTE — PROGRESS NOTES
"Well Woman Visit    CC: Scheduled annual well gyn visit  Chief Complaint   Patient presents with    Follow-up     Referred for dysmenorrhea and fibroids         HPI:   44 y.o. who presents today for annual exam. Patient states periods are heavy with passage of large clots and lasting > 7 days. She had an US done which was overall WNL but showed two small fibroids. We discussed different options including medicine verses surgical options. We will plan follow up in 3-4 weeks to discuss further. She is sexually active with one male partner . She denies any H/O STDS.  She denies any pain with intercourse. She denies any family H/O breast, uterine or ovarian cancer. She denies any vaginal odor, vaginal discharge, dysuria/hematuria, F/C, D/C, N/V, CP or SOB. Patient reports that she is not currently experiencing any symptoms of urinary incontinence.      History: PMHx, Meds, Allergies, PSHx, Social Hx, and POBHx all reviewed and updated.    ROS:  Review of Systems - General ROS: negative  Psychological ROS: negative  Endocrine ROS: negative  Breast ROS: negative  Respiratory ROS: negative  Cardiovascular ROS: negative  Gastrointestinal ROS: negative  Genito-Urinary ROS: negative  Musculoskeletal ROS: negative  Neurological ROS: negative  Dermatological ROS: negative        PHYSICAL EXAM:      /94   Ht 157.5 cm (62\")   Wt 80.7 kg (178 lb)   LMP 2024 (Exact Date)   BMI 32.56 kg/m²  Not found.    General- NAD, alert and oriented, appropriate  Psych- Normal mood, good memory  Neck- No masses, no thyroid enlargement  CV- Regular rhythm, no murnurs  Resp- CTA to bases, no wheezes  Abdomen- Soft, non distended, non tender, no masses    Breast Exam: Breast self awareness counseled    Pelvic Exam:   External genitalia- Normal female, no lesions  Urethra/meatus- Normal, no masses, non tender  Bladder- Normal, no masses, non tender  Vagina- Normal, no atrophy, no lesions, no discharge.  Prolapse : none noted "   Cvx- Normal, no lesions, no discharge, No cervical motion tenderness  Uterus- Normal size, shape & consistency.  Non tender, mobile.  Adnexa- No mass, non tender    Chaperone present for pelvic exam       Lymphatic- No palpable neck, axillary, or groin nodes  Ext- No edema, no cyanosis    Skin- No lesions, no rashes, no acanthosis nigricans      ASSESSMENT and PLAN:    Diagnoses and all orders for this visit:    1. Well woman exam (Primary)  -     IgP, Aptima HPV  -     Mammo Screening Digital Tomosynthesis Bilateral With CAD; Future        Preventative:  1. Annuals every year; Cytology collections per prevailing guidelines.   2. Mammograms begin every year at 41 yo if no abnormalities are found and no family history.  3. Bone density studies begin at 64 yo. If no fracture history or osteoporosis family history.  4. Colonoscopy begins at 44 yo. Repeat every ten years if negative and no family history.  5. Calcium of 7622-5053 mg/day in split dosing  6. Vitamin D 400-800 IU/day  7. All other preventative health recommendations will be managed by the patients Primary care physician.    She understands the importance of having any ordered tests to be performed in a timely fashion.  The risks of not performing them include, but are not limited to, advanced cancer stages, bone loss from osteoporosis and/or subsequent increase in morbidity and/or mortality.  She is encouraged to review her results online and/or contact or office if she has questions.     Follow Up:  Return in about 4 weeks (around 2/13/2025) for Follow up for AUB.    I spent 20 minutes on the separately reported service of Annual. This time is not included in the time used to support the E/M service also reported today.        Kirstin Packer DO  01/16/2025    [facility]  [address] [place] [address]  Dept: [phone]  Dept Fax: [phone]  Loc: [phone]  Loc Fax: [phone]

## 2025-01-20 ENCOUNTER — TELEPHONE (OUTPATIENT)
Dept: OBSTETRICS AND GYNECOLOGY | Facility: CLINIC | Age: 45
End: 2025-01-20
Payer: COMMERCIAL

## 2025-01-20 LAB
CYTOLOGIST CVX/VAG CYTO: NORMAL
CYTOLOGY CVX/VAG DOC CYTO: NORMAL
CYTOLOGY CVX/VAG DOC THIN PREP: NORMAL
DX ICD CODE: NORMAL
HPV I/H RISK 4 DNA CVX QL PROBE+SIG AMP: NEGATIVE
Lab: NORMAL
OTHER STN SPEC: NORMAL
STAT OF ADQ CVX/VAG CYTO-IMP: NORMAL

## 2025-01-20 NOTE — TELEPHONE ENCOUNTER
----- Message from Kirstin Packer sent at 1/20/2025  1:51 PM EST -----  Normal pap smear.     Electronically signed by:    Kirstin Packer DO  01/20/25  13:51 EST

## 2025-02-10 NOTE — PROGRESS NOTES
Chief Complaint  Annual Exam, Hyperlipidemia, and Hypertension    Subjective          Lidia Davis presents to [facility]  History of Present Illness    History of Present Illness  The patient presents for evaluation of stress and anxiety.    She is currently experiencing significant stress due to her 's upcoming cardiac surgery, scheduled for tomorrow. Additionally, she is managing the sale of their home, which has been on the market since 01/01/2025. She also reports that her son, who has ADHD, is struggling academically, failing to submit assignments and receiving poor grades. She is considering whether an adjustment in his medication might be beneficial. She experienced a severe headache yesterday, which she attributes to tension. She is not interested in taking any medication for stress and anxiety.    She is scheduled for a follow-up appointment with her OB-GYN next week. She has been presented with three treatment options: intrauterine device (IUD) insertion, ablation, or complete hysterectomy. She is still contemplating her decision. She reports no symptoms of perimenopause, such as hot flashes.    FAMILY HISTORY  Her mother had a partial hysterectomy at the age of 59.        Hypertension  This is a chronic problem. The problem is uncontrolled. Pertinent negatives include no anxiety, blurred vision, chest pain, headaches, malaise/fatigue, neck pain, orthopnea, palpitations, peripheral edema, PND, shortness of breath or sweats. Past treatments include diuretics. The current treatment provides mild improvement. Compliance problems include diet and exercise.  There is no history of angina, kidney disease, CAD/MI, CVA, heart failure, left ventricular hypertrophy, PVD or retinopathy.    Runs 120's/80-90s      Current Outpatient Medications   Medication Instructions    fluticasone (FLONASE) 50 MCG/ACT nasal spray 2 sprays, Nasal, Daily     "losartan-hydrochlorothiazide (Hyzaar) 50-12.5 MG per tablet 1 tablet, Oral, Daily       The following portions of the patient's history were reviewed and updated as appropriate: allergies, current medications, past family history, past medical history, past social history, past surgical history, and problem list.    Objective   Vital Signs:   /83 (BP Location: Left arm, Patient Position: Sitting, Cuff Size: Adult)   Pulse 83   Temp 98.4 °F (36.9 °C) (Temporal)   Ht 157.5 cm (62\")   Wt 80.3 kg (177 lb)   SpO2 99%   BMI 32.37 kg/m²     BP Readings from Last 3 Encounters:   02/12/25 135/83   01/16/25 128/94   11/12/24 139/83     Wt Readings from Last 3 Encounters:   02/12/25 80.3 kg (177 lb)   01/16/25 80.7 kg (178 lb)   11/12/24 79.8 kg (176 lb)           Physical Exam     Appearance: No acute distress, well-nourished  Head: normocephalic, atraumatic  Eyes: extraocular movements intact, no scleral icterus, no conjunctival injection  Ears, Nose, and Throat: external ears normal, nares patent, moist mucous membranes  Cardiovascular: regular rate and rhythm. no murmurs, rubs, or gallops. no edema  Respiratory: breathing comfortably, symmetric chest rise, clear to auscultation bilaterally. No wheezes, rales, or rhonchi.  Neuro: alert and oriented to time, place, and person. Normal gait  Psych: normal mood and affect     Physical Exam        Result Review :   The following data was reviewed by: RD Moya on 02/12/2025:  Common labs          2/12/2025    14:53   Common Labs   Glucose 82    BUN 11    Creatinine 1.15    Sodium 140    Potassium 3.7    Chloride 104    Calcium 9.6    Albumin 4.1    Total Bilirubin 0.2    Alkaline Phosphatase 73    AST (SGOT) 21    ALT (SGPT) 11    WBC 8.54    Hemoglobin 11.8    Hematocrit 36.4    Platelets 284    Total Cholesterol 208    Triglycerides 122    HDL Cholesterol 50    LDL Cholesterol  136        Results           Office Visit with Kirstin Packer, DO " (01/16/2025)   US Non-ob Transvaginal (10/24/2024 15:48)   PDF Report (01/16/2025 14:36)   Iron and TIBC (01/16/2024 14:56)  CBC & Differential (01/16/2024 14:56)  Comprehensive Metabolic Panel (01/16/2024 14:56)  TSH Rfx On Abnormal To Free T4 (01/16/2024 14:56)  Lipid Panel (01/16/2024 14:56)      Lab Results   Component Value Date    SARSANTIGEN Detected (A) 08/08/2024    FLUAAG Not Detected 08/08/2024    FLUBAG Not Detected 08/08/2024    RAPSCRN Negative 08/08/2024            Assessment and Plan    Diagnoses and all orders for this visit:    1. Annual physical exam (Primary)  -     TSH Rfx On Abnormal To Free T4  -     Lipid Panel  -     Comprehensive Metabolic Panel  -     CBC & Differential    2. Screening for thyroid disorder  -     TSH Rfx On Abnormal To Free T4    3. Screening for lipid disorders  -     Lipid Panel    4. Hypertension, unspecified type  -     Lipid Panel  -     Comprehensive Metabolic Panel  -     CBC & Differential    5. Iron deficiency anemia, unspecified iron deficiency anemia type      Advised on diet, physical activity, sunscreen, helmet, texting and driving, etc    Assessment & Plan  1. Stress and anxiety.  She reports significant stress related to her 's upcoming cardiac surgery and the sale of their house. She experienced a severe headache yesterday, which she attributes to tension. She was offered medication for stress and anxiety but declined. She is advised to call if she needs further assistance.    2. Perimenopausal symptoms.  She is considering treatment options, including an IUD, ablation, or hysterectomy. She is leaning towards ablation but is not ready to make a decision yet. She does not report any perimenopausal symptoms such as hot flashes. A blood draw will be conducted to monitor her condition.    Medications Discontinued During This Encounter   Medication Reason    methylPREDNISolone sodium succinate (SOLU-Medrol) injection 125 mg *Therapy completed           Follow Up   Return in about 3 months (around 5/12/2025) for Hypertension.  Patient was given instructions and counseling regarding her condition or for health maintenance advice. Please see specific information pulled into the AVS if appropriate.       RD Moya  02/19/25  10:34 EST      Patient or patient representative verbalized consent for the use of Ambient Listening during the visit with  RD Moya for chart documentation. 2/19/2025  10:34 EST

## 2025-02-12 ENCOUNTER — OFFICE VISIT (OUTPATIENT)
Dept: INTERNAL MEDICINE | Facility: CLINIC | Age: 45
End: 2025-02-12
Payer: COMMERCIAL

## 2025-02-12 VITALS
SYSTOLIC BLOOD PRESSURE: 135 MMHG | BODY MASS INDEX: 32.57 KG/M2 | DIASTOLIC BLOOD PRESSURE: 83 MMHG | WEIGHT: 177 LBS | HEIGHT: 62 IN | OXYGEN SATURATION: 99 % | TEMPERATURE: 98.4 F | HEART RATE: 83 BPM

## 2025-02-12 DIAGNOSIS — Z13.29 SCREENING FOR THYROID DISORDER: ICD-10-CM

## 2025-02-12 DIAGNOSIS — D50.9 IRON DEFICIENCY ANEMIA, UNSPECIFIED IRON DEFICIENCY ANEMIA TYPE: Chronic | ICD-10-CM

## 2025-02-12 DIAGNOSIS — I10 HYPERTENSION, UNSPECIFIED TYPE: ICD-10-CM

## 2025-02-12 DIAGNOSIS — Z13.220 SCREENING FOR LIPID DISORDERS: ICD-10-CM

## 2025-02-12 DIAGNOSIS — Z00.00 ANNUAL PHYSICAL EXAM: Primary | ICD-10-CM

## 2025-02-12 LAB
ALBUMIN SERPL-MCNC: 4.1 G/DL (ref 3.5–5.2)
ALBUMIN/GLOB SERPL: 1.3 G/DL
ALP SERPL-CCNC: 73 U/L (ref 39–117)
ALT SERPL W P-5'-P-CCNC: 11 U/L (ref 1–33)
ANION GAP SERPL CALCULATED.3IONS-SCNC: 10.7 MMOL/L (ref 5–15)
AST SERPL-CCNC: 21 U/L (ref 1–32)
BASOPHILS # BLD AUTO: 0.08 10*3/MM3 (ref 0–0.2)
BASOPHILS NFR BLD AUTO: 0.9 % (ref 0–1.5)
BILIRUB SERPL-MCNC: 0.2 MG/DL (ref 0–1.2)
BUN SERPL-MCNC: 11 MG/DL (ref 6–20)
BUN/CREAT SERPL: 9.6 (ref 7–25)
CALCIUM SPEC-SCNC: 9.6 MG/DL (ref 8.6–10.5)
CHLORIDE SERPL-SCNC: 104 MMOL/L (ref 98–107)
CHOLEST SERPL-MCNC: 208 MG/DL (ref 0–200)
CO2 SERPL-SCNC: 25.3 MMOL/L (ref 22–29)
CREAT SERPL-MCNC: 1.15 MG/DL (ref 0.57–1)
DEPRECATED RDW RBC AUTO: 40.2 FL (ref 37–54)
EGFRCR SERPLBLD CKD-EPI 2021: 60 ML/MIN/1.73
EOSINOPHIL # BLD AUTO: 0.07 10*3/MM3 (ref 0–0.4)
EOSINOPHIL NFR BLD AUTO: 0.8 % (ref 0.3–6.2)
ERYTHROCYTE [DISTWIDTH] IN BLOOD BY AUTOMATED COUNT: 13.5 % (ref 12.3–15.4)
GLOBULIN UR ELPH-MCNC: 3.2 GM/DL
GLUCOSE SERPL-MCNC: 82 MG/DL (ref 65–99)
HCT VFR BLD AUTO: 36.4 % (ref 34–46.6)
HDLC SERPL-MCNC: 50 MG/DL (ref 40–60)
HGB BLD-MCNC: 11.8 G/DL (ref 12–15.9)
IMM GRANULOCYTES # BLD AUTO: 0.03 10*3/MM3 (ref 0–0.05)
IMM GRANULOCYTES NFR BLD AUTO: 0.4 % (ref 0–0.5)
LDLC SERPL CALC-MCNC: 136 MG/DL (ref 0–100)
LDLC/HDLC SERPL: 2.67 {RATIO}
LYMPHOCYTES # BLD AUTO: 2.03 10*3/MM3 (ref 0.7–3.1)
LYMPHOCYTES NFR BLD AUTO: 23.8 % (ref 19.6–45.3)
MCH RBC QN AUTO: 26.6 PG (ref 26.6–33)
MCHC RBC AUTO-ENTMCNC: 32.4 G/DL (ref 31.5–35.7)
MCV RBC AUTO: 82.2 FL (ref 79–97)
MONOCYTES # BLD AUTO: 0.64 10*3/MM3 (ref 0.1–0.9)
MONOCYTES NFR BLD AUTO: 7.5 % (ref 5–12)
NEUTROPHILS NFR BLD AUTO: 5.69 10*3/MM3 (ref 1.7–7)
NEUTROPHILS NFR BLD AUTO: 66.6 % (ref 42.7–76)
NRBC BLD AUTO-RTO: 0 /100 WBC (ref 0–0.2)
PLATELET # BLD AUTO: 284 10*3/MM3 (ref 140–450)
PMV BLD AUTO: 11.4 FL (ref 6–12)
POTASSIUM SERPL-SCNC: 3.7 MMOL/L (ref 3.5–5.2)
PROT SERPL-MCNC: 7.3 G/DL (ref 6–8.5)
RBC # BLD AUTO: 4.43 10*6/MM3 (ref 3.77–5.28)
SODIUM SERPL-SCNC: 140 MMOL/L (ref 136–145)
TRIGL SERPL-MCNC: 122 MG/DL (ref 0–150)
TSH SERPL DL<=0.05 MIU/L-ACNC: 1.17 UIU/ML (ref 0.27–4.2)
VLDLC SERPL-MCNC: 22 MG/DL (ref 5–40)
WBC NRBC COR # BLD AUTO: 8.54 10*3/MM3 (ref 3.4–10.8)

## 2025-02-12 PROCEDURE — 99396 PREV VISIT EST AGE 40-64: CPT | Performed by: NURSE PRACTITIONER

## 2025-02-12 PROCEDURE — 99214 OFFICE O/P EST MOD 30 MIN: CPT | Performed by: NURSE PRACTITIONER

## 2025-02-12 PROCEDURE — 80050 GENERAL HEALTH PANEL: CPT | Performed by: NURSE PRACTITIONER

## 2025-02-12 PROCEDURE — 80061 LIPID PANEL: CPT | Performed by: NURSE PRACTITIONER

## 2025-02-14 ENCOUNTER — TELEPHONE (OUTPATIENT)
Dept: INTERNAL MEDICINE | Facility: CLINIC | Age: 45
End: 2025-02-14
Payer: COMMERCIAL

## 2025-02-14 DIAGNOSIS — N17.9 AKI (ACUTE KIDNEY INJURY): Primary | ICD-10-CM

## 2025-02-14 NOTE — TELEPHONE ENCOUNTER
"Attempted to contact patient. Left  message to call the office back    Relay   HUB ok to read/advise  \"Cholesterol levels elevated.  Recommend healthy diet and increase physical activity.     Renal function low.  Recommend increasing fluid intake.   Repeat BMP in 1 to 2 weeks. These can be done at outpatient lab located at [address]. No appt required. \"        "

## 2025-02-14 NOTE — TELEPHONE ENCOUNTER
Name: Lidia Davis    Relationship: Self    Best Callback Number: [phone]    HUB PROVIDED THE RELAY MESSAGE FROM THE OFFICE   PATIENT VOICED UNDERSTANDING AND HAS NO FURTHER QUESTIONS AT THIS TIME    ADDITIONAL INFORMATION:  PATIENT IS ASKING IF NURSES CAN SEND HER A [employer] MESSAGE WITH THE ADDRESS OF THE OUTPATIENT LAB.

## 2025-02-14 NOTE — TELEPHONE ENCOUNTER
----- Message from Veronica Acuna sent at 2/13/2025  4:14 PM EST -----  The 10-year ASCVD risk score (Mil[place] RUSSELL, et al., 2019) is: 1.5%    Cholesterol levels elevated.  Recommend healthy diet and increase physical activity.    Renal function low.  Recommend increasing fluid intake.   Repeat BMP in 1 to 2 weeks

## 2025-03-19 ENCOUNTER — LAB (OUTPATIENT)
Dept: LAB | Facility: HOSPITAL | Age: 45
End: 2025-03-19
Payer: COMMERCIAL

## 2025-03-19 DIAGNOSIS — N17.9 AKI (ACUTE KIDNEY INJURY): ICD-10-CM

## 2025-03-19 LAB
ANION GAP SERPL CALCULATED.3IONS-SCNC: 13 MMOL/L (ref 5–15)
BUN SERPL-MCNC: 11 MG/DL (ref 6–20)
BUN/CREAT SERPL: 11 (ref 7–25)
CALCIUM SPEC-SCNC: 9 MG/DL (ref 8.6–10.5)
CHLORIDE SERPL-SCNC: 99 MMOL/L (ref 98–107)
CO2 SERPL-SCNC: 25 MMOL/L (ref 22–29)
CREAT SERPL-MCNC: 1 MG/DL (ref 0.57–1)
EGFRCR SERPLBLD CKD-EPI 2021: 70.9 ML/MIN/1.73
GLUCOSE SERPL-MCNC: 89 MG/DL (ref 65–99)
POTASSIUM SERPL-SCNC: 3.6 MMOL/L (ref 3.5–5.2)
SODIUM SERPL-SCNC: 137 MMOL/L (ref 136–145)

## 2025-03-19 PROCEDURE — 80048 BASIC METABOLIC PNL TOTAL CA: CPT

## 2025-03-19 PROCEDURE — 36415 COLL VENOUS BLD VENIPUNCTURE: CPT

## 2025-04-10 DIAGNOSIS — I10 HYPERTENSION, UNSPECIFIED TYPE: ICD-10-CM

## 2025-04-10 RX ORDER — LOSARTAN POTASSIUM AND HYDROCHLOROTHIAZIDE 12.5; 5 MG/1; MG/1
1 TABLET ORAL DAILY
Qty: 90 TABLET | Refills: 1 | Status: SHIPPED | OUTPATIENT
Start: 2025-04-10

## 2025-06-26 PROCEDURE — 87086 URINE CULTURE/COLONY COUNT: CPT | Performed by: NURSE PRACTITIONER

## 2025-06-27 ENCOUNTER — LAB (OUTPATIENT)
Dept: LAB | Facility: HOSPITAL | Age: 45
End: 2025-06-27
Payer: COMMERCIAL

## 2025-06-27 DIAGNOSIS — Z00.00 ANNUAL PHYSICAL EXAM: ICD-10-CM

## 2025-06-27 DIAGNOSIS — Z00.00 ANNUAL PHYSICAL EXAM: Primary | ICD-10-CM

## 2025-06-27 LAB
ALBUMIN SERPL-MCNC: 4.1 G/DL (ref 3.5–5.2)
ALBUMIN/GLOB SERPL: 1.5 G/DL
ALP SERPL-CCNC: 81 U/L (ref 39–117)
ALT SERPL W P-5'-P-CCNC: 12 U/L (ref 1–33)
ANION GAP SERPL CALCULATED.3IONS-SCNC: 11.2 MMOL/L (ref 5–15)
AST SERPL-CCNC: 18 U/L (ref 1–32)
BASOPHILS # BLD AUTO: 0.08 10*3/MM3 (ref 0–0.2)
BASOPHILS NFR BLD AUTO: 1 % (ref 0–1.5)
BILIRUB SERPL-MCNC: <0.2 MG/DL (ref 0–1.2)
BUN SERPL-MCNC: 12 MG/DL (ref 6–20)
BUN/CREAT SERPL: 12.9 (ref 7–25)
CALCIUM SPEC-SCNC: 9.2 MG/DL (ref 8.6–10.5)
CHLORIDE SERPL-SCNC: 101 MMOL/L (ref 98–107)
CO2 SERPL-SCNC: 26.8 MMOL/L (ref 22–29)
CREAT SERPL-MCNC: 0.93 MG/DL (ref 0.57–1)
DEPRECATED RDW RBC AUTO: 41.6 FL (ref 37–54)
EGFRCR SERPLBLD CKD-EPI 2021: 77.4 ML/MIN/1.73
EOSINOPHIL # BLD AUTO: 0.16 10*3/MM3 (ref 0–0.4)
EOSINOPHIL NFR BLD AUTO: 1.9 % (ref 0.3–6.2)
ERYTHROCYTE [DISTWIDTH] IN BLOOD BY AUTOMATED COUNT: 14.4 % (ref 12.3–15.4)
GLOBULIN UR ELPH-MCNC: 2.8 GM/DL
GLUCOSE SERPL-MCNC: 95 MG/DL (ref 65–99)
HCT VFR BLD AUTO: 34.5 % (ref 34–46.6)
HGB BLD-MCNC: 11.1 G/DL (ref 12–15.9)
IMM GRANULOCYTES # BLD AUTO: 0.03 10*3/MM3 (ref 0–0.05)
IMM GRANULOCYTES NFR BLD AUTO: 0.4 % (ref 0–0.5)
LYMPHOCYTES # BLD AUTO: 1.95 10*3/MM3 (ref 0.7–3.1)
LYMPHOCYTES NFR BLD AUTO: 23.6 % (ref 19.6–45.3)
MCH RBC QN AUTO: 26.3 PG (ref 26.6–33)
MCHC RBC AUTO-ENTMCNC: 32.2 G/DL (ref 31.5–35.7)
MCV RBC AUTO: 81.8 FL (ref 79–97)
MONOCYTES # BLD AUTO: 0.7 10*3/MM3 (ref 0.1–0.9)
MONOCYTES NFR BLD AUTO: 8.5 % (ref 5–12)
NEUTROPHILS NFR BLD AUTO: 5.33 10*3/MM3 (ref 1.7–7)
NEUTROPHILS NFR BLD AUTO: 64.6 % (ref 42.7–76)
NRBC BLD AUTO-RTO: 0 /100 WBC (ref 0–0.2)
PLATELET # BLD AUTO: 321 10*3/MM3 (ref 140–450)
PMV BLD AUTO: 11.3 FL (ref 6–12)
POTASSIUM SERPL-SCNC: 3.7 MMOL/L (ref 3.5–5.2)
PROT SERPL-MCNC: 6.9 G/DL (ref 6–8.5)
RBC # BLD AUTO: 4.22 10*6/MM3 (ref 3.77–5.28)
SODIUM SERPL-SCNC: 139 MMOL/L (ref 136–145)
WBC NRBC COR # BLD AUTO: 8.25 10*3/MM3 (ref 3.4–10.8)

## 2025-06-27 PROCEDURE — 80053 COMPREHEN METABOLIC PANEL: CPT

## 2025-06-27 PROCEDURE — 36415 COLL VENOUS BLD VENIPUNCTURE: CPT

## 2025-06-27 PROCEDURE — 85025 COMPLETE CBC W/AUTO DIFF WBC: CPT | Performed by: NURSE PRACTITIONER

## 2025-06-30 ENCOUNTER — RESULTS FOLLOW-UP (OUTPATIENT)
Dept: INTERNAL MEDICINE | Facility: CLINIC | Age: 45
End: 2025-06-30
Payer: COMMERCIAL

## 2025-06-30 NOTE — TELEPHONE ENCOUNTER
Name: Lidia Davis      Relationship: Self      Best Callback Number: [phone]       HUB PROVIDED THE RELAY MESSAGE FROM THE OFFICE      PATIENT: VOICED UNDERSTANDING AND HAS NO FURTHER QUESTIONS AT THIS TIME    ADDITIONAL INFORMATION: PATIENT STATES THAT SHE IS BETTER THIS WEEK AND DOES NOT NEED APPOINTMENT

## 2025-06-30 NOTE — TELEPHONE ENCOUNTER
"Attempted to contact patient. Left message to call the office back    Relay   HUB ok to read/advise  \"No acute issues with labs. Will you call and check on her. She was in pretty bad pain Friday. May need appt this week if she isnt better \"          "

## 2025-08-13 ENCOUNTER — OFFICE VISIT (OUTPATIENT)
Dept: INTERNAL MEDICINE | Facility: CLINIC | Age: 45
End: 2025-08-13
Payer: COMMERCIAL

## 2025-08-13 VITALS
TEMPERATURE: 97.7 F | SYSTOLIC BLOOD PRESSURE: 126 MMHG | BODY MASS INDEX: 32.09 KG/M2 | HEART RATE: 85 BPM | OXYGEN SATURATION: 97 % | DIASTOLIC BLOOD PRESSURE: 76 MMHG | HEIGHT: 62 IN | WEIGHT: 174.4 LBS

## 2025-08-13 DIAGNOSIS — D50.9 IRON DEFICIENCY ANEMIA, UNSPECIFIED IRON DEFICIENCY ANEMIA TYPE: Chronic | ICD-10-CM

## 2025-08-13 DIAGNOSIS — I10 HYPERTENSION, UNSPECIFIED TYPE: Primary | ICD-10-CM

## 2025-08-13 DIAGNOSIS — Z13.220 ENCOUNTER FOR SCREENING FOR LIPID DISORDER: ICD-10-CM

## 2025-08-13 DIAGNOSIS — Z13.29 SCREENING FOR THYROID DISORDER: ICD-10-CM

## 2025-08-13 PROBLEM — R19.4 CHANGE IN BOWEL HABITS: Status: RESOLVED | Noted: 2023-06-01 | Resolved: 2025-08-13

## 2025-08-13 PROBLEM — K62.5 RECTAL BLEEDING: Status: RESOLVED | Noted: 2023-06-01 | Resolved: 2025-08-13

## 2025-08-13 LAB
ALBUMIN SERPL-MCNC: 4.2 G/DL (ref 3.5–5.2)
ALBUMIN/GLOB SERPL: 1.4 G/DL
ALP SERPL-CCNC: 82 U/L (ref 39–117)
ALT SERPL W P-5'-P-CCNC: 9 U/L (ref 1–33)
ANION GAP SERPL CALCULATED.3IONS-SCNC: 11.7 MMOL/L (ref 5–15)
AST SERPL-CCNC: 19 U/L (ref 1–32)
BASOPHILS # BLD AUTO: 0.05 10*3/MM3 (ref 0–0.2)
BASOPHILS NFR BLD AUTO: 0.6 % (ref 0–1.5)
BILIRUB SERPL-MCNC: <0.2 MG/DL (ref 0–1.2)
BUN SERPL-MCNC: 10 MG/DL (ref 6–20)
BUN/CREAT SERPL: 10.8 (ref 7–25)
CALCIUM SPEC-SCNC: 9.5 MG/DL (ref 8.6–10.5)
CHLORIDE SERPL-SCNC: 102 MMOL/L (ref 98–107)
CHOLEST SERPL-MCNC: 206 MG/DL (ref 0–200)
CO2 SERPL-SCNC: 24.3 MMOL/L (ref 22–29)
CREAT SERPL-MCNC: 0.93 MG/DL (ref 0.57–1)
DEPRECATED RDW RBC AUTO: 43.4 FL (ref 37–54)
EGFRCR SERPLBLD CKD-EPI 2021: 77.4 ML/MIN/1.73
EOSINOPHIL # BLD AUTO: 0.13 10*3/MM3 (ref 0–0.4)
EOSINOPHIL NFR BLD AUTO: 1.6 % (ref 0.3–6.2)
ERYTHROCYTE [DISTWIDTH] IN BLOOD BY AUTOMATED COUNT: 14.6 % (ref 12.3–15.4)
GLOBULIN UR ELPH-MCNC: 3 GM/DL
GLUCOSE SERPL-MCNC: 109 MG/DL (ref 65–99)
HCT VFR BLD AUTO: 36.8 % (ref 34–46.6)
HDLC SERPL-MCNC: 46 MG/DL (ref 40–60)
HGB BLD-MCNC: 11.5 G/DL (ref 12–15.9)
IMM GRANULOCYTES # BLD AUTO: 0.02 10*3/MM3 (ref 0–0.05)
IMM GRANULOCYTES NFR BLD AUTO: 0.3 % (ref 0–0.5)
IRON 24H UR-MRATE: 28 MCG/DL (ref 37–145)
IRON SATN MFR SERPL: 6 % (ref 20–50)
LDLC SERPL CALC-MCNC: 138 MG/DL (ref 0–100)
LDLC/HDLC SERPL: 2.95 {RATIO}
LYMPHOCYTES # BLD AUTO: 1.87 10*3/MM3 (ref 0.7–3.1)
LYMPHOCYTES NFR BLD AUTO: 23.7 % (ref 19.6–45.3)
MCH RBC QN AUTO: 25.8 PG (ref 26.6–33)
MCHC RBC AUTO-ENTMCNC: 31.3 G/DL (ref 31.5–35.7)
MCV RBC AUTO: 82.7 FL (ref 79–97)
MONOCYTES # BLD AUTO: 0.47 10*3/MM3 (ref 0.1–0.9)
MONOCYTES NFR BLD AUTO: 6 % (ref 5–12)
NEUTROPHILS NFR BLD AUTO: 5.34 10*3/MM3 (ref 1.7–7)
NEUTROPHILS NFR BLD AUTO: 67.8 % (ref 42.7–76)
NRBC BLD AUTO-RTO: 0 /100 WBC (ref 0–0.2)
PLATELET # BLD AUTO: 285 10*3/MM3 (ref 140–450)
PMV BLD AUTO: 11.3 FL (ref 6–12)
POTASSIUM SERPL-SCNC: 3.7 MMOL/L (ref 3.5–5.2)
PROT SERPL-MCNC: 7.2 G/DL (ref 6–8.5)
RBC # BLD AUTO: 4.45 10*6/MM3 (ref 3.77–5.28)
SODIUM SERPL-SCNC: 138 MMOL/L (ref 136–145)
TIBC SERPL-MCNC: 504 MCG/DL (ref 298–536)
TRANSFERRIN SERPL-MCNC: 338 MG/DL (ref 200–360)
TRIGL SERPL-MCNC: 122 MG/DL (ref 0–150)
TSH SERPL DL<=0.05 MIU/L-ACNC: 1.3 UIU/ML (ref 0.27–4.2)
VLDLC SERPL-MCNC: 22 MG/DL (ref 5–40)
WBC NRBC COR # BLD AUTO: 7.88 10*3/MM3 (ref 3.4–10.8)

## 2025-08-13 PROCEDURE — 80050 GENERAL HEALTH PANEL: CPT | Performed by: NURSE PRACTITIONER

## 2025-08-13 PROCEDURE — 84466 ASSAY OF TRANSFERRIN: CPT | Performed by: NURSE PRACTITIONER

## 2025-08-13 PROCEDURE — 80061 LIPID PANEL: CPT | Performed by: NURSE PRACTITIONER

## 2025-08-13 PROCEDURE — 83540 ASSAY OF IRON: CPT | Performed by: NURSE PRACTITIONER

## 2025-08-13 RX ORDER — LOSARTAN POTASSIUM AND HYDROCHLOROTHIAZIDE 12.5; 5 MG/1; MG/1
1 TABLET ORAL DAILY
Qty: 90 TABLET | Refills: 1 | Status: SHIPPED | OUTPATIENT
Start: 2025-08-13

## 2025-08-20 ENCOUNTER — RESULTS FOLLOW-UP (OUTPATIENT)
Dept: INTERNAL MEDICINE | Facility: CLINIC | Age: 45
End: 2025-08-20
Payer: COMMERCIAL

## (undated) DEVICE — Device

## (undated) DEVICE — LINER SURG CANSTR SXN S/RIGD 1500CC

## (undated) DEVICE — Device: Brand: DEFENDO AIR/WATER/SUCTION AND BIOPSY VALVE

## (undated) DEVICE — CONN JET HYDRA H20 AUXILIARY DISP

## (undated) DEVICE — SOL IRRG H2O PL/BG 1000ML STRL

## (undated) DEVICE — SOLIDIFIER LIQLOC PLS 1500CC BT

## (undated) DEVICE — SNAR POLYP CAPTIFLEX XS/OVL 11X2.4MM 240CM 1P/U

## (undated) DEVICE — SINGLE-USE BIOPSY FORCEPS: Brand: RADIAL JAW 4

## (undated) DEVICE — THE SINGLE USE ETRAP – POLYP TRAP IS USED FOR SUCTION RETRIEVAL OF ENDOSCOPICALLY REMOVED POLYPS.: Brand: ETRAP